# Patient Record
Sex: FEMALE | Race: WHITE | NOT HISPANIC OR LATINO | Employment: OTHER | URBAN - METROPOLITAN AREA
[De-identification: names, ages, dates, MRNs, and addresses within clinical notes are randomized per-mention and may not be internally consistent; named-entity substitution may affect disease eponyms.]

---

## 2017-01-24 ENCOUNTER — OFFICE VISIT - HEALTHEAST (OUTPATIENT)
Dept: INTERNAL MEDICINE | Facility: CLINIC | Age: 67
End: 2017-01-24

## 2017-01-24 DIAGNOSIS — M94.9 DISORDER OF BONE AND CARTILAGE: ICD-10-CM

## 2017-01-24 DIAGNOSIS — E78.1 PURE HYPERGLYCERIDEMIA: ICD-10-CM

## 2017-01-24 DIAGNOSIS — M89.9 DISORDER OF BONE AND CARTILAGE: ICD-10-CM

## 2017-01-24 DIAGNOSIS — D12.6 BENIGN NEOPLASM OF COLON: ICD-10-CM

## 2017-01-24 DIAGNOSIS — Z00.00 PREVENTATIVE HEALTH CARE: ICD-10-CM

## 2017-01-24 LAB
CHOLEST SERPL-MCNC: 237 MG/DL
FASTING STATUS PATIENT QL REPORTED: YES
HDLC SERPL-MCNC: 50 MG/DL
LDLC SERPL CALC-MCNC: 153 MG/DL
TRIGL SERPL-MCNC: 172 MG/DL

## 2017-01-24 ASSESSMENT — MIFFLIN-ST. JEOR: SCORE: 1395.74

## 2017-02-11 ENCOUNTER — RECORDS - HEALTHEAST (OUTPATIENT)
Dept: GENERAL RADIOLOGY | Age: 67
End: 2017-02-11

## 2017-02-11 ENCOUNTER — OFFICE VISIT - HEALTHEAST (OUTPATIENT)
Dept: FAMILY MEDICINE | Facility: CLINIC | Age: 67
End: 2017-02-11

## 2017-02-11 DIAGNOSIS — J01.00 ACUTE MAXILLARY SINUSITIS, UNSPECIFIED: ICD-10-CM

## 2017-02-11 DIAGNOSIS — J01.00 ACUTE NON-RECURRENT MAXILLARY SINUSITIS: ICD-10-CM

## 2017-02-11 DIAGNOSIS — R05.9 COUGH: ICD-10-CM

## 2017-02-11 DIAGNOSIS — B30.9 VIRAL CONJUNCTIVITIS: ICD-10-CM

## 2017-02-15 ENCOUNTER — HOSPITAL ENCOUNTER (OUTPATIENT)
Dept: MAMMOGRAPHY | Facility: HOSPITAL | Age: 67
Discharge: HOME | End: 2017-02-15
Attending: INTERNAL MEDICINE

## 2017-02-15 DIAGNOSIS — Z12.31 VISIT FOR SCREENING MAMMOGRAM: ICD-10-CM

## 2017-03-01 ENCOUNTER — RECORDS - HEALTHEAST (OUTPATIENT)
Dept: BONE DENSITY | Facility: CLINIC | Age: 67
End: 2017-03-01

## 2017-03-01 ENCOUNTER — RECORDS - HEALTHEAST (OUTPATIENT)
Dept: ADMINISTRATIVE | Facility: OTHER | Age: 67
End: 2017-03-01

## 2017-03-01 DIAGNOSIS — M94.9 DISORDER OF CARTILAGE, UNSPECIFIED: ICD-10-CM

## 2017-03-01 DIAGNOSIS — M89.9 DISORDER OF BONE, UNSPECIFIED: ICD-10-CM

## 2017-03-04 ENCOUNTER — COMMUNICATION - HEALTHEAST (OUTPATIENT)
Dept: INTERNAL MEDICINE | Facility: CLINIC | Age: 67
End: 2017-03-04

## 2017-10-23 ENCOUNTER — OFFICE VISIT - HEALTHEAST (OUTPATIENT)
Dept: INTERNAL MEDICINE | Facility: CLINIC | Age: 67
End: 2017-10-23

## 2017-10-23 DIAGNOSIS — J06.9 URI (UPPER RESPIRATORY INFECTION): ICD-10-CM

## 2017-10-23 DIAGNOSIS — R05.9 COUGH: ICD-10-CM

## 2017-10-23 DIAGNOSIS — R50.9 FEVER: ICD-10-CM

## 2018-02-06 ENCOUNTER — TRANSFERRED RECORDS (OUTPATIENT)
Dept: HEALTH INFORMATION MANAGEMENT | Facility: CLINIC | Age: 68
End: 2018-02-06

## 2018-02-06 ENCOUNTER — RECORDS - HEALTHEAST (OUTPATIENT)
Dept: LAB | Facility: CLINIC | Age: 68
End: 2018-02-06

## 2018-02-08 LAB — BACTERIA SPEC CULT: NO GROWTH

## 2018-02-10 LAB — BACTERIA SPEC CULT: ABNORMAL

## 2018-02-13 LAB
BACTERIA SPEC CULT: NORMAL
BACTERIA SPEC CULT: NORMAL

## 2018-02-20 ENCOUNTER — MEDICAL CORRESPONDENCE (OUTPATIENT)
Dept: HEALTH INFORMATION MANAGEMENT | Facility: CLINIC | Age: 68
End: 2018-02-20

## 2018-07-12 ENCOUNTER — RECORDS - HEALTHEAST (OUTPATIENT)
Dept: ADMINISTRATIVE | Facility: OTHER | Age: 68
End: 2018-07-12

## 2018-08-14 ENCOUNTER — OFFICE VISIT - HEALTHEAST (OUTPATIENT)
Dept: INTERNAL MEDICINE | Facility: CLINIC | Age: 68
End: 2018-08-14

## 2018-08-14 DIAGNOSIS — M89.9 DISORDER OF BONE AND CARTILAGE: ICD-10-CM

## 2018-08-14 DIAGNOSIS — M94.9 DISORDER OF BONE AND CARTILAGE: ICD-10-CM

## 2018-08-14 DIAGNOSIS — Z00.00 ROUTINE HEALTH MAINTENANCE: ICD-10-CM

## 2018-08-14 DIAGNOSIS — E78.1 PURE HYPERGLYCERIDEMIA: ICD-10-CM

## 2018-08-14 LAB
ALBUMIN SERPL-MCNC: 3.9 G/DL (ref 3.5–5)
ALP SERPL-CCNC: 92 U/L (ref 45–120)
ALT SERPL W P-5'-P-CCNC: 16 U/L (ref 0–45)
ANION GAP SERPL CALCULATED.3IONS-SCNC: 11 MMOL/L (ref 5–18)
AST SERPL W P-5'-P-CCNC: 15 U/L (ref 0–40)
BILIRUB SERPL-MCNC: 0.5 MG/DL (ref 0–1)
BUN SERPL-MCNC: 23 MG/DL (ref 8–22)
CALCIUM SERPL-MCNC: 9.9 MG/DL (ref 8.5–10.5)
CHLORIDE BLD-SCNC: 106 MMOL/L (ref 98–107)
CHOLEST SERPL-MCNC: 216 MG/DL
CO2 SERPL-SCNC: 24 MMOL/L (ref 22–31)
CREAT SERPL-MCNC: 0.8 MG/DL (ref 0.6–1.1)
ERYTHROCYTE [DISTWIDTH] IN BLOOD BY AUTOMATED COUNT: 11.6 % (ref 11–14.5)
FASTING STATUS PATIENT QL REPORTED: YES
GFR SERPL CREATININE-BSD FRML MDRD: >60 ML/MIN/1.73M2
GLUCOSE BLD-MCNC: 91 MG/DL (ref 70–125)
HCT VFR BLD AUTO: 41.2 % (ref 35–47)
HDLC SERPL-MCNC: 48 MG/DL
HGB BLD-MCNC: 14.1 G/DL (ref 12–16)
LDLC SERPL CALC-MCNC: 135 MG/DL
MCH RBC QN AUTO: 29.8 PG (ref 27–34)
MCHC RBC AUTO-ENTMCNC: 34.3 G/DL (ref 32–36)
MCV RBC AUTO: 87 FL (ref 80–100)
PLATELET # BLD AUTO: 249 THOU/UL (ref 140–440)
PMV BLD AUTO: 7 FL (ref 7–10)
POTASSIUM BLD-SCNC: 4.9 MMOL/L (ref 3.5–5)
PROT SERPL-MCNC: 7.2 G/DL (ref 6–8)
RBC # BLD AUTO: 4.74 MILL/UL (ref 3.8–5.4)
SODIUM SERPL-SCNC: 141 MMOL/L (ref 136–145)
TRIGL SERPL-MCNC: 163 MG/DL
WBC: 3.8 THOU/UL (ref 4–11)

## 2018-08-14 ASSESSMENT — MIFFLIN-ST. JEOR: SCORE: 1373.64

## 2018-08-15 LAB
25(OH)D3 SERPL-MCNC: 33 NG/ML (ref 30–80)
25(OH)D3 SERPL-MCNC: 33 NG/ML (ref 30–80)

## 2018-09-13 ENCOUNTER — RECORDS - HEALTHEAST (OUTPATIENT)
Dept: ADMINISTRATIVE | Facility: OTHER | Age: 68
End: 2018-09-13

## 2018-12-03 ENCOUNTER — COMMUNICATION - HEALTHEAST (OUTPATIENT)
Dept: INTERNAL MEDICINE | Facility: CLINIC | Age: 68
End: 2018-12-03

## 2018-12-06 ENCOUNTER — AMBULATORY - HEALTHEAST (OUTPATIENT)
Dept: NURSING | Facility: CLINIC | Age: 68
End: 2018-12-06

## 2018-12-06 DIAGNOSIS — Z23 NEED FOR SHINGLES VACCINE: ICD-10-CM

## 2018-12-15 ENCOUNTER — HOSPITAL ENCOUNTER (OUTPATIENT)
Dept: MAMMOGRAPHY | Facility: CLINIC | Age: 68
Discharge: HOME | End: 2018-12-15
Attending: INTERNAL MEDICINE

## 2018-12-15 DIAGNOSIS — Z12.31 VISIT FOR SCREENING MAMMOGRAM: ICD-10-CM

## 2019-03-26 ENCOUNTER — TRANSFERRED RECORDS (OUTPATIENT)
Dept: HEALTH INFORMATION MANAGEMENT | Facility: CLINIC | Age: 69
End: 2019-03-26

## 2019-06-25 ENCOUNTER — RECORDS - HEALTHEAST (OUTPATIENT)
Dept: ADMINISTRATIVE | Facility: OTHER | Age: 69
End: 2019-06-25

## 2019-09-10 ENCOUNTER — TRANSFERRED RECORDS (OUTPATIENT)
Dept: HEALTH INFORMATION MANAGEMENT | Facility: CLINIC | Age: 69
End: 2019-09-10

## 2019-10-14 ENCOUNTER — COMMUNICATION - HEALTHEAST (OUTPATIENT)
Dept: INTERNAL MEDICINE | Facility: CLINIC | Age: 69
End: 2019-10-14

## 2019-12-30 ENCOUNTER — TRANSFERRED RECORDS (OUTPATIENT)
Dept: HEALTH INFORMATION MANAGEMENT | Facility: CLINIC | Age: 69
End: 2019-12-30

## 2020-01-17 ENCOUNTER — OFFICE VISIT - HEALTHEAST (OUTPATIENT)
Dept: INTERNAL MEDICINE | Facility: CLINIC | Age: 70
End: 2020-01-17

## 2020-01-17 DIAGNOSIS — M89.9 DISORDER OF BONE AND CARTILAGE: ICD-10-CM

## 2020-01-17 DIAGNOSIS — Z00.00 ENCOUNTER FOR PREVENTIVE CARE: ICD-10-CM

## 2020-01-17 DIAGNOSIS — E78.1 PURE HYPERGLYCERIDEMIA: ICD-10-CM

## 2020-01-17 DIAGNOSIS — M94.9 DISORDER OF BONE AND CARTILAGE: ICD-10-CM

## 2020-01-17 LAB
ALBUMIN SERPL-MCNC: 4.1 G/DL (ref 3.5–5)
ALP SERPL-CCNC: 102 U/L (ref 45–120)
ALT SERPL W P-5'-P-CCNC: 17 U/L (ref 0–45)
ANION GAP SERPL CALCULATED.3IONS-SCNC: 8 MMOL/L (ref 5–18)
AST SERPL W P-5'-P-CCNC: 17 U/L (ref 0–40)
BILIRUB SERPL-MCNC: 0.4 MG/DL (ref 0–1)
BUN SERPL-MCNC: 19 MG/DL (ref 8–22)
CALCIUM SERPL-MCNC: 9.5 MG/DL (ref 8.5–10.5)
CHLORIDE BLD-SCNC: 108 MMOL/L (ref 98–107)
CHOLEST SERPL-MCNC: 224 MG/DL
CO2 SERPL-SCNC: 24 MMOL/L (ref 22–31)
CREAT SERPL-MCNC: 0.78 MG/DL (ref 0.6–1.1)
ERYTHROCYTE [DISTWIDTH] IN BLOOD BY AUTOMATED COUNT: 11.1 % (ref 11–14.5)
FASTING STATUS PATIENT QL REPORTED: YES
GFR SERPL CREATININE-BSD FRML MDRD: >60 ML/MIN/1.73M2
GLUCOSE BLD-MCNC: 98 MG/DL (ref 70–125)
HBA1C MFR BLD: 6 % (ref 3.5–6)
HCT VFR BLD AUTO: 42.6 % (ref 35–47)
HDLC SERPL-MCNC: 59 MG/DL
HGB BLD-MCNC: 14.7 G/DL (ref 12–16)
LDLC SERPL CALC-MCNC: 142 MG/DL
MCH RBC QN AUTO: 31 PG (ref 27–34)
MCHC RBC AUTO-ENTMCNC: 34.4 G/DL (ref 32–36)
MCV RBC AUTO: 90 FL (ref 80–100)
PLATELET # BLD AUTO: 238 THOU/UL (ref 140–440)
PMV BLD AUTO: 7.3 FL (ref 7–10)
POTASSIUM BLD-SCNC: 4.5 MMOL/L (ref 3.5–5)
PROT SERPL-MCNC: 7.1 G/DL (ref 6–8)
RBC # BLD AUTO: 4.73 MILL/UL (ref 3.8–5.4)
SODIUM SERPL-SCNC: 140 MMOL/L (ref 136–145)
TRIGL SERPL-MCNC: 116 MG/DL
WBC: 4.1 THOU/UL (ref 4–11)

## 2020-01-17 ASSESSMENT — MIFFLIN-ST. JEOR: SCORE: 1370.47

## 2020-01-17 ASSESSMENT — ANXIETY QUESTIONNAIRES
1. FEELING NERVOUS, ANXIOUS, OR ON EDGE: NOT AT ALL
2. NOT BEING ABLE TO STOP OR CONTROL WORRYING: NOT AT ALL

## 2020-01-20 ENCOUNTER — AMBULATORY - HEALTHEAST (OUTPATIENT)
Dept: SCHEDULING | Facility: CLINIC | Age: 70
End: 2020-01-20

## 2020-01-20 DIAGNOSIS — M89.9 DISORDER OF BONE AND CARTILAGE: ICD-10-CM

## 2020-01-20 DIAGNOSIS — M94.9 DISORDER OF BONE AND CARTILAGE: ICD-10-CM

## 2020-01-20 LAB
25(OH)D3 SERPL-MCNC: 30 NG/ML (ref 30–80)
25(OH)D3 SERPL-MCNC: 30 NG/ML (ref 30–80)

## 2020-02-18 ENCOUNTER — HOSPITAL ENCOUNTER (OUTPATIENT)
Dept: MAMMOGRAPHY | Facility: CLINIC | Age: 70
Discharge: HOME | End: 2020-02-18
Attending: INTERNAL MEDICINE

## 2020-02-18 DIAGNOSIS — Z12.31 VISIT FOR SCREENING MAMMOGRAM: ICD-10-CM

## 2020-02-18 DIAGNOSIS — Z00.00 ENCOUNTER FOR PREVENTIVE CARE: ICD-10-CM

## 2020-02-24 ENCOUNTER — HOSPITAL ENCOUNTER (OUTPATIENT)
Dept: MAMMOGRAPHY | Facility: CLINIC | Age: 70
Discharge: HOME | End: 2020-02-24
Attending: INTERNAL MEDICINE

## 2020-02-24 DIAGNOSIS — N64.89 BREAST ASYMMETRY: ICD-10-CM

## 2020-03-19 ENCOUNTER — TELEPHONE (OUTPATIENT)
Dept: OPHTHALMOLOGY | Facility: CLINIC | Age: 70
End: 2020-03-19

## 2020-03-19 NOTE — TELEPHONE ENCOUNTER
COVID-19 RESCHEDULES    Date contacted: March 19, 2020 9:09 AM    Type of contact: Spoke with patient or left message    Current appointment date: 3/26/2020      Attending provider: rosario    Current Eye Symptoms: NA    Refills needed: NA    Best contact for rescheduling: NA    Best date/time for rescheduling: NA    AMELIA Cook 9:09 AM March 19, 2020

## 2020-05-06 ENCOUNTER — TELEPHONE (OUTPATIENT)
Dept: OPHTHALMOLOGY | Facility: CLINIC | Age: 70
End: 2020-05-06

## 2020-05-20 NOTE — TELEPHONE ENCOUNTER
Scheduled first available with Dr. Yi July 24th  Left message with information and reviewed Dr. Yi/facitiator reviewing referrals individually and will send message to facilitator to review confirm first available vs sooner appt    Brian Quiles RN 4:58 PM 05/20/20        [facility]    Phone Message    May a detailed message be left on voicemail: yes     Reason for Call: Other: Patient is calling she has been waiting for a call back since 05/06 for a second opinion. Please follow up asap to discuss. Thank you.     Action Taken: Message routed to:  [facility] eye    Travel Screening: Not Applicable

## 2020-07-08 ENCOUNTER — COMMUNICATION - HEALTHEAST (OUTPATIENT)
Dept: INTERNAL MEDICINE | Facility: CLINIC | Age: 70
End: 2020-07-08

## 2020-07-08 DIAGNOSIS — R92.8 ABNORMAL MAMMOGRAM: ICD-10-CM

## 2020-08-05 ENCOUNTER — HOSPITAL ENCOUNTER (OUTPATIENT)
Dept: MAMMOGRAPHY | Facility: CLINIC | Age: 70
Discharge: HOME | End: 2020-08-05
Attending: INTERNAL MEDICINE

## 2020-08-05 DIAGNOSIS — R92.8 ABNORMAL MAMMOGRAM: ICD-10-CM

## 2020-10-23 ENCOUNTER — VIRTUAL VISIT (OUTPATIENT)
Dept: FAMILY MEDICINE | Facility: OTHER | Age: 70
End: 2020-10-23

## 2020-10-23 DIAGNOSIS — Z20.822 SUSPECTED COVID-19 VIRUS INFECTION: ICD-10-CM

## 2020-10-23 DIAGNOSIS — Z20.822 SUSPECTED COVID-19 VIRUS INFECTION: Primary | ICD-10-CM

## 2020-10-23 PROCEDURE — U0003 INFECTIOUS AGENT DETECTION BY NUCLEIC ACID (DNA OR RNA); SEVERE ACUTE RESPIRATORY SYNDROME CORONAVIRUS 2 (SARS-COV-2) (CORONAVIRUS DISEASE [COVID-19]), AMPLIFIED PROBE TECHNIQUE, MAKING USE OF HIGH THROUGHPUT TECHNOLOGIES AS DESCRIBED BY CMS-2020-01-R: HCPCS | Performed by: FAMILY MEDICINE

## 2020-10-23 NOTE — PROGRESS NOTES
"Date: 10/23/2020 09:53:51  Clinician: Yelitza Ross  Clinician NPI: [id number]  Patient: Vida Najera  Patient : 1950  [place] [address]  Patient Phone: [phone]  Visit Protocol: URI  Patient Summary:  Vida is a 70 year old ( : 1950 ) female who initiated a [facility] Visit for COVID-19 (Coronavirus) evaluation and screening. When asked the question \"Please sign me up to receive news, health information and promotions. \", Vida responded \"Yes\".    Vida states her symptoms started 1-2 days ago.   Her symptoms consist of a headache, nasal congestion, and myalgia.   Symptom details     Nasal secretions: The color of her mucus is clear.    Headache: She states the headache is mild (1-3 on a 10 point pain scale).      Vida denies having ear pain, wheezing, fever, enlarged lymph nodes, cough, anosmia, vomiting, rhinitis, nausea, facial pain or pressure, chills, malaise, sore throat, teeth pain, ageusia, and diarrhea. She also denies taking antibiotic medication in the past month and having recent facial or sinus surgery in the past 60 days. She is not experiencing dyspnea.   Precipitating events  She has not recently been exposed to someone with influenza. Vida has been in close contact with the following high risk individuals: adults 65 or older and children under the age of 5.   Pertinent COVID-19 (Coronavirus) information  In the past 14 days, Vida has not worked in a congregate living setting.   She does not work or volunteer as healthcare worker or a  and does not work or volunteer in a healthcare facility.   Vida also has not lived in a congregate living setting in the past 14 days. She does not live with a healthcare worker.   Vida has not had a close contact with a laboratory-confirmed COVID-19 patient within 14 days of symptom onset.   Since 2019, Vida and has not had upper respiratory infection or influenza-like illness. " Has not been diagnosed with lab-confirmed COVID-19 test   Pertinent medical history  Vida does not get yeast infections when she takes antibiotics.   Vida does not need a return to work/school note.   Weight: 175 lbs   Vida does not smoke or use smokeless tobacco.   Additional information as reported by the patient (free text):  had contact with someone who tested positive for Covid virus.  He had fever, headache.  I have had contact with him, although tried to isolate from each other once he had symptoms.  I had headache, fatigue and low-grade fever Wed-Thurs.  My son-in-law (who we saw Sunday katie frequently) is health care worker.  There are grandchildren we also saw at the same time.   Weight: 175 lbs    MEDICATIONS: PreserVision AREDS oral, Vitamin D3 oral, acyclovir oral, ALLERGIES: NKDA  Clinician Response:  Dear Vida,         Your symptoms show that you may have coronavirus (COVID-19). This&nbsp;illness can cause fever, cough and trouble breathing. Many people get a mild case and get better on their own. Some people can get very sick.  What should I do?  We would like to test you for this virus.  1. Please call [phone] to schedule your visit. Explain that you were referred by [facility] to have a COVID-19 test. Be ready to share your [facility] visit ID number. Do not schedule your appointment until you have had at least 2 days of symptoms or you may receive a false negative result.  The following will serve as your written order for this COVID Test, ordered by me, for the indication of suspected COVID [Z20.828]: The test will be ordered in [facility], our electronic health record, after you are scheduled. It will show as ordered and authorized by Seth Hayden MD.  Order: COVID-19 (Coronavirus) PCR for SYMPTOMATIC testing from [facility].    2. When it's time for your COVID test:  Stay at least 6 feet away from others. (If someone will drive you to your test, stay in the backseat, as far away from the  as  "you can.)  Cover your mouth and nose with a mask, tissue or washcloth.  Go straight to the testing site. Don't make any stops on the way there or back.    3.Starting now:&nbsp;Stay home and away from others (self-isolate) until:   You've had&nbsp;no&nbsp;fever---and no medicine that reduces fever---for one full day (24 hours).&nbsp;And...  Your other symptoms have gotten better. For example, your cough or breathing has improved.&nbsp;And...  At least&nbsp;10 days&nbsp;have passed since your symptoms started.    During this time, don't leave the house except for testing or medical care.   Stay in your own room, even for meals. Use your own bathroom if you can.  Stay away from others in your home. No hugging, kissing or shaking hands. No visitors.  Don't go to work, school or anywhere else.   Clean \"high touch\" surfaces often (doorknobs, counters, handles, etc.). Use a household cleaning spray or wipes. You'll find a full list of  on the EPA website:&nbsp;www.epa.gov/pesticide-registration/list-n-disinfectants-use-against-sars-cov-2.   Cover your mouth and nose with a mask, tissue or washcloth to avoid spreading germs.  Wash your hands and face often. Use soap and water.  Caregivers in these groups are at risk for severe illness due to COVID-19:  o People 65 years and older  o People who live in a nursing home or long-term care facility  o People with chronic disease (lung, heart, cancer, diabetes, kidney, liver, immunologic)  o People who have a weakened immune system, including those who:   Are in cancer treatment  Take medicine that weakens the immune system, such as corticosteroids  Had a bone marrow or organ transplant  Have an immune deficiency  Have poorly controlled HIV or AIDS  Are obese (body mass index of 40 or higher)  Smoke regularly   o Caregivers should wear gloves while washing dishes, handling laundry and cleaning bedrooms and bathrooms.  o Use caution when washing and drying laundry: Don't " shake dirty laundry, and use the warmest water setting that you can.  o For more tips, go to&nbsp;www.cdc.gov/coronavirus/2019-ncov/downloads/10Things.pdf.   How can I take care of myself?    Get lots of rest. Drink extra fluids&nbsp;(unless a doctor has told you not to).  Take Tylenol (acetaminophen) for fever or pain.&nbsp;If you have liver or kidney problems, ask your family doctor if it's okay to take Tylenol.   Adults can take either:   650 mg (two 325 mg pills) every 4 to 6 hours,&nbsp;or...  1,000 mg (two 500 mg pills) every 8 hours as needed.  Note:&nbsp;Don't take more than 3,000 mg in one day. Acetaminophen is found in many medicines (both prescribed and over-the-counter medicines). Read all labels to be sure you don't take too much.   For children, check the Tylenol bottle for the right dose. The dose is based on the child's age or weight.   If you have other health problems (like cancer, heart failure, an organ transplant or severe kidney disease):&nbsp;Call your specialty clinic if you don't feel better in the next 2 days.    Know when to call 911.&nbsp;Emergency warning signs include:   Trouble breathing or shortness of breath Pain or pressure in the chest that doesn't go away Feeling confused like you haven't felt before, or not being able to wake up Bluish-colored lips or face.  Where can I get more information?   [facility] -- About COVID-19:&nbsp;[email or web address]  CDC -- What to Do If You're Sick:&nbsp;www.cdc.gov/coronavirus/2019-ncov/about/steps-when-sick.html  CDC -- Ending Home Isolation:&nbsp;www.cdc.gov/coronavirus/2019-ncov/hcp/disposition-in-home-patients.html  CDC -- Caring for Someone:&nbsp;www.cdc.gov/coronavirus/2019-ncov/if-you-are-sick/care-for-someone.html  [facility] -- Interim Guidance for Hospital Discharge to Home:&nbsp;www.[facility].mn.us/diseases/coronavirus/hcp/hospdischarge.pdf  [facility] clinical trials (COVID-19 research  studies):&nbsp;clinicalaffairs.[facility]-clinical-trials  Below are the COVID-19 hotlines at the [place] [facility] ([facility]). Interpreters are available.   For health questions: Call 310-810-8420 or 1-382.688.2647 (7 a.m. to 7 p.m.) For questions about schools and childcare: Call 055-115-5046 or 1-973.643.2416 (7 a.m. to 7 p.m.)           Diagnosis: Contact with and (suspected) exposure to other viral communicable diseases  Diagnosis ICD: Z20.828

## 2020-10-24 LAB
SARS-COV-2 RNA SPEC QL NAA+PROBE: NOT DETECTED
SPECIMEN SOURCE: NORMAL

## 2020-11-14 ENCOUNTER — HEALTH MAINTENANCE LETTER (OUTPATIENT)
Age: 70
End: 2020-11-14

## 2021-02-01 ENCOUNTER — COMMUNICATION - HEALTHEAST (OUTPATIENT)
Dept: INTERNAL MEDICINE | Facility: CLINIC | Age: 71
End: 2021-02-01

## 2021-02-01 ENCOUNTER — AMBULATORY - HEALTHEAST (OUTPATIENT)
Dept: INTERNAL MEDICINE | Facility: CLINIC | Age: 71
End: 2021-02-01

## 2021-02-01 DIAGNOSIS — R92.8 ABNORMAL MAMMOGRAM: ICD-10-CM

## 2021-03-02 ENCOUNTER — OFFICE VISIT - HEALTHEAST (OUTPATIENT)
Dept: INTERNAL MEDICINE | Facility: CLINIC | Age: 71
End: 2021-03-02

## 2021-03-02 DIAGNOSIS — Z00.00 MEDICARE ANNUAL WELLNESS VISIT, SUBSEQUENT: ICD-10-CM

## 2021-03-02 DIAGNOSIS — Z00.00 ENCOUNTER FOR PREVENTIVE CARE: ICD-10-CM

## 2021-03-02 DIAGNOSIS — M94.9 DISORDER OF BONE AND CARTILAGE: ICD-10-CM

## 2021-03-02 DIAGNOSIS — M89.9 DISORDER OF BONE AND CARTILAGE: ICD-10-CM

## 2021-03-02 DIAGNOSIS — D12.6 BENIGN NEOPLASM OF COLON, UNSPECIFIED PART OF COLON: ICD-10-CM

## 2021-03-02 DIAGNOSIS — Z20.822 ENCOUNTER FOR LABORATORY TESTING FOR COVID-19 VIRUS: ICD-10-CM

## 2021-03-02 DIAGNOSIS — E74.818 OTHER DISORDERS OF GLUCOSE TRANSPORT (H): ICD-10-CM

## 2021-03-02 DIAGNOSIS — E78.1 PURE HYPERGLYCERIDEMIA: ICD-10-CM

## 2021-03-02 LAB
ALBUMIN SERPL-MCNC: 4 G/DL (ref 3.5–5)
ALP SERPL-CCNC: 104 U/L (ref 45–120)
ALT SERPL W P-5'-P-CCNC: 14 U/L (ref 0–45)
ANION GAP SERPL CALCULATED.3IONS-SCNC: 6 MMOL/L (ref 5–18)
AST SERPL W P-5'-P-CCNC: 15 U/L (ref 0–40)
BILIRUB SERPL-MCNC: 0.4 MG/DL (ref 0–1)
BUN SERPL-MCNC: 22 MG/DL (ref 8–28)
CALCIUM SERPL-MCNC: 9 MG/DL (ref 8.5–10.5)
CHLORIDE BLD-SCNC: 106 MMOL/L (ref 98–107)
CHOLEST SERPL-MCNC: 233 MG/DL
CO2 SERPL-SCNC: 28 MMOL/L (ref 22–31)
CREAT SERPL-MCNC: 0.77 MG/DL (ref 0.6–1.1)
ERYTHROCYTE [DISTWIDTH] IN BLOOD BY AUTOMATED COUNT: 12.8 % (ref 11–14.5)
FASTING STATUS PATIENT QL REPORTED: YES
GFR SERPL CREATININE-BSD FRML MDRD: >60 ML/MIN/1.73M2
GLUCOSE BLD-MCNC: 92 MG/DL (ref 70–125)
HBA1C MFR BLD: 5.6 %
HCT VFR BLD AUTO: 42.6 % (ref 35–47)
HDLC SERPL-MCNC: 53 MG/DL
HGB BLD-MCNC: 13.8 G/DL (ref 12–16)
LDLC SERPL CALC-MCNC: 138 MG/DL
MCH RBC QN AUTO: 29.8 PG (ref 27–34)
MCHC RBC AUTO-ENTMCNC: 32.4 G/DL (ref 32–36)
MCV RBC AUTO: 92 FL (ref 80–100)
PLATELET # BLD AUTO: 254 THOU/UL (ref 140–440)
PMV BLD AUTO: 9.8 FL (ref 7–10)
POTASSIUM BLD-SCNC: 4.7 MMOL/L (ref 3.5–5)
PROT SERPL-MCNC: 7.1 G/DL (ref 6–8)
RBC # BLD AUTO: 4.63 MILL/UL (ref 3.8–5.4)
SODIUM SERPL-SCNC: 140 MMOL/L (ref 136–145)
TRIGL SERPL-MCNC: 211 MG/DL
TSH SERPL DL<=0.005 MIU/L-ACNC: 2.51 UIU/ML (ref 0.3–5)
WBC: 3.9 THOU/UL (ref 4–11)

## 2021-03-02 RX ORDER — ANTIOX #8/OM3/DHA/EPA/LUT/ZEAX 250-2.5 MG
CAPSULE ORAL
Status: SHIPPED | COMMUNITY
Start: 2020-08-26

## 2021-03-02 RX ORDER — TIMOLOL MALEATE 5 MG/ML
1 SOLUTION/ DROPS OPHTHALMIC DAILY
Status: SHIPPED | COMMUNITY
Start: 2020-08-26 | End: 2023-03-15

## 2021-03-02 ASSESSMENT — MIFFLIN-ST. JEOR: SCORE: 1389.52

## 2021-03-03 LAB
25(OH)D3 SERPL-MCNC: 32 NG/ML (ref 30–80)
25(OH)D3 SERPL-MCNC: 32 NG/ML (ref 30–80)

## 2021-03-04 ENCOUNTER — COMMUNICATION - HEALTHEAST (OUTPATIENT)
Dept: INTERNAL MEDICINE | Facility: CLINIC | Age: 71
End: 2021-03-04

## 2021-03-08 ENCOUNTER — AMBULATORY - HEALTHEAST (OUTPATIENT)
Dept: FAMILY MEDICINE | Facility: CLINIC | Age: 71
End: 2021-03-08

## 2021-03-08 DIAGNOSIS — Z20.822 ENCOUNTER FOR LABORATORY TESTING FOR COVID-19 VIRUS: ICD-10-CM

## 2021-03-09 LAB
SARS-COV-2 PCR COMMENT: NORMAL
SARS-COV-2 RNA SPEC QL NAA+PROBE: NEGATIVE
SARS-COV-2 VIRUS SPECIMEN SOURCE: NORMAL

## 2021-03-10 ENCOUNTER — COMMUNICATION - HEALTHEAST (OUTPATIENT)
Dept: SCHEDULING | Facility: CLINIC | Age: 71
End: 2021-03-10

## 2021-03-22 ENCOUNTER — HOSPITAL ENCOUNTER (OUTPATIENT)
Dept: MAMMOGRAPHY | Facility: CLINIC | Age: 71
Discharge: HOME | End: 2021-03-22
Attending: INTERNAL MEDICINE

## 2021-03-22 ENCOUNTER — OFFICE VISIT - HEALTHEAST (OUTPATIENT)
Dept: INTERNAL MEDICINE | Facility: CLINIC | Age: 71
End: 2021-03-22

## 2021-03-22 DIAGNOSIS — R92.8 ABNORMAL MAMMOGRAM: ICD-10-CM

## 2021-03-22 DIAGNOSIS — L50.9 URTICARIA: ICD-10-CM

## 2021-03-22 RX ORDER — ACYCLOVIR 400 MG/1
400 TABLET ORAL 2 TIMES DAILY
Status: SHIPPED | COMMUNITY
Start: 2021-03-03 | End: 2023-11-29

## 2021-05-29 ENCOUNTER — RECORDS - HEALTHEAST (OUTPATIENT)
Dept: ADMINISTRATIVE | Facility: CLINIC | Age: 71
End: 2021-05-29

## 2021-05-30 VITALS — BODY MASS INDEX: 29.03 KG/M2 | WEIGHT: 185 LBS | HEIGHT: 67 IN

## 2021-05-30 VITALS — WEIGHT: 184.1 LBS | BODY MASS INDEX: 28.62 KG/M2

## 2021-05-31 VITALS — BODY MASS INDEX: 27.94 KG/M2 | WEIGHT: 179.7 LBS

## 2021-06-01 VITALS — HEIGHT: 67 IN | WEIGHT: 181 LBS | BODY MASS INDEX: 28.41 KG/M2

## 2021-06-02 ENCOUNTER — RECORDS - HEALTHEAST (OUTPATIENT)
Dept: ADMINISTRATIVE | Facility: CLINIC | Age: 71
End: 2021-06-02

## 2021-06-02 NOTE — TELEPHONE ENCOUNTER
Who is calling:  Vida  Reason for Call:  Patient wants to schedule an Annual Wellness Visit.  The first opening this writer could find was January 17, 2020.  Patient would like to be put on a wait list for a sooner appointment.    Date of last appointment with primary care: 8/14/2018  Okay to leave a detailed message: Yes

## 2021-06-04 VITALS
BODY MASS INDEX: 28.3 KG/M2 | HEART RATE: 78 BPM | WEIGHT: 180.3 LBS | SYSTOLIC BLOOD PRESSURE: 118 MMHG | HEIGHT: 67 IN | OXYGEN SATURATION: 98 % | DIASTOLIC BLOOD PRESSURE: 72 MMHG

## 2021-06-05 ENCOUNTER — RECORDS - HEALTHEAST (OUTPATIENT)
Dept: INTERNAL MEDICINE | Facility: CLINIC | Age: 71
End: 2021-06-05

## 2021-06-05 VITALS
DIASTOLIC BLOOD PRESSURE: 76 MMHG | OXYGEN SATURATION: 97 % | WEIGHT: 180 LBS | HEART RATE: 83 BPM | BODY MASS INDEX: 28.19 KG/M2 | SYSTOLIC BLOOD PRESSURE: 126 MMHG

## 2021-06-05 VITALS
OXYGEN SATURATION: 98 % | BODY MASS INDEX: 28.96 KG/M2 | RESPIRATION RATE: 18 BRPM | DIASTOLIC BLOOD PRESSURE: 70 MMHG | WEIGHT: 184.5 LBS | HEART RATE: 82 BPM | HEIGHT: 67 IN | SYSTOLIC BLOOD PRESSURE: 122 MMHG

## 2021-06-05 DIAGNOSIS — M94.9 DISORDER OF BONE AND CARTILAGE: ICD-10-CM

## 2021-06-05 DIAGNOSIS — M89.9 DISORDER OF BONE AND CARTILAGE: ICD-10-CM

## 2021-06-05 NOTE — PROGRESS NOTES
Assessment and Plan:   Annual wellness reviewed.  No new needs identified.  Patient is living independently and doing quite well.    1. Encounter for preventive care  She is up-to-date on immunizations.  Colon cancer screening is up-to-date.  She is due for an update on both mammogram and bone densitometry.  Additionally, have discussed the importance of a healthy diet, maintenance of healthy weight and regular exercise.  - Mammo Screening Bilateral; Future  - HM2(CBC w/o Differential)  - Comprehensive Metabolic Panel    2. Essential Hypertriglyceridemia  We will update labs.  If cholesterol is still altered, patient believes that she can do some improvement with exercise.  - Lipid Cascade FASTING  - Glycosylated Hemoglobin A1c    3. Osteopenia  Due for bone densitometry.  Last bone density 3 years ago was low risk however.  Have discussed the importance of weightbearing and balance exercise  - Vitamin D, Total (25-Hydroxy)  - DXA Bone Density Scan; Future     The patient's current medical problems were reviewed.      The following health maintenance schedule was reviewed with the patient and provided in printed form in the after visit summary:   Health Maintenance   Topic Date Due     TD 18+ HE  04/15/2017     DXA SCAN  03/01/2019     MAMMOGRAM  12/15/2020     MEDICARE ANNUAL WELLNESS VISIT  01/17/2021     FALL RISK ASSESSMENT  01/17/2021     LIPID  08/14/2023     ADVANCE CARE PLANNING  08/14/2023     COLONOSCOPY  06/25/2029     HEPATITIS C SCREENING  Completed     PNEUMOCOCCAL IMMUNIZATION 65+ LOW/MEDIUM RISK  Completed     INFLUENZA VACCINE RULE BASED  Completed     ZOSTER VACCINES  Completed        Subjective:   Chief Complaint: Vida Najera is an 70 y.o. female here for an Annual Wellness visit.   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  She has absolutely no chief complaints.    Have discussed health  maintenance issues.  She has some concerns regarding long-term use of minocycline for her ocular rosacea.  She also has glaucoma and will need cataract surgery.  Of note, she denies any untoward reactions to local or general anesthetic agents.    She has some questions with regard to aspirin use and calcium supplementation.  The data with regard to these are reviewed.    Lifestyle reviewed.  She is not exercising regularly.  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  She states she is up and down the stairs with children.    Review of Systems: She denies any chest pain, shortness of breath, bowel or bladder issues please see above.  The rest of the review of systems are negative for all systems.    Patient Care Team:  Luiza Gannon MD as PCP - Mike Archer MD as Physician (Ophthalmology)  Luiza Gannon MD as Assigned PCP     Patient Active Problem List   Diagnosis     Benign Adenomatous Polyp Of The Large Intestine     Essential Hypertriglyceridemia     Osteopenia     Menopause Has Occurred     No past medical history on file.   Past Surgical History:   Procedure Laterality Date     BREAST BIOPSY Right 2002     HYSTERECTOMY  2005     OOPHORECTOMY Bilateral 2005      Family History   Problem Relation Age of Onset     No Medical Problems Mother      No Medical Problems Sister      No Medical Problems Daughter      No Medical Problems Maternal Grandmother      No Medical Problems Maternal Grandfather      No Medical Problems Paternal Grandmother      No Medical Problems Paternal Grandfather      No Medical Problems Maternal Aunt      No Medical Problems Paternal Aunt      Coronary artery disease Father         And Grandpa      Celiac disease Daughter         &Brother     BRCA 1/2 Neg Hx      Breast cancer Neg Hx      Cancer Neg Hx      Colon cancer Neg Hx      Endometrial cancer Neg Hx      Ovarian cancer Neg Hx       Social History     Socioeconomic History      "Marital status:      Spouse name: Not on file     Number of children: Not on file     Years of education: Not on file     Highest education level: Not on file   Occupational History     Not on file   Social Needs     Financial resource strain: Not on file     Food insecurity:     Worry: Not on file     Inability: Not on file     Transportation needs:     Medical: Not on file     Non-medical: Not on file   Tobacco Use     Smoking status: Never Smoker     Smokeless tobacco: Never Used   Substance and Sexual Activity     Alcohol use: Not on file     Drug use: Not on file     Sexual activity: Not on file   Lifestyle     Physical activity:     Days per week: Not on file     Minutes per session: Not on file     Stress: Not on file   Relationships     Social connections:     Talks on phone: Not on file     Gets together: Not on file     Attends Protestant service: Not on file     Active member of club or organization: Not on file     Attends meetings of clubs or organizations: Not on file     Relationship status: Not on file     Intimate partner violence:     Fear of current or ex partner: Not on file     Emotionally abused: Not on file     Physically abused: Not on file     Forced sexual activity: Not on file   Other Topics Concern     Not on file   Social History Narrative     Not on file      Current Outpatient Medications   Medication Sig Dispense Refill     cholecalciferol, vitamin D3, (VITAMIN D3) 2,000 unit cap Take 1 tablet by mouth once a week.        doxycycline (MONODOX) 50 MG capsule Take 50 mg by mouth daily.       latanoprost (XALATAN) 0.005 % ophthalmic solution 1 drop at bedtime.       PROPYLENE GLYCOL/ (SYSTANE ULTRA OPHT) Apply to eye 2 (two) times a day.       No current facility-administered medications for this visit.       Objective:   Vital Signs:   Visit Vitals  /72 (Patient Site: Left Arm, Patient Position: Sitting, Cuff Size: Adult Large)   Pulse 78   Ht 5' 7\" (1.702 m)   Wt 180 " lb 4.8 oz (81.8 kg)   LMP 05/07/2005   SpO2 98%   BMI 28.24 kg/m         VisionScreening:  No exam data present     PHYSICAL EXAM  EYES: Eyelids, conjunctiva, and sclera were normal. Pupils were normal. Cornea, iris, and lens were normal bilaterally.  HEAD, EARS, NOSE, MOUTH, AND THROAT: Head and face were normal. Hearing was normal to voice and the ears were normal to external exam. Nose appearance was normal and there was no discharge. Oropharynx was normal.  TMs were normal.  NECK: Neck appearance was normal. There were no neck masses and the thyroid was not enlarged.  RESPIRATORY: Breathing pattern was normal and the chest moved symmetrically.   Lung sounds were equal bilaterally.  CARDIOVASCULAR: Heart rate and rhythm were normal.  S1 and S2 were normal and there were no extra sounds or murmurs. Peripheral pulses in arms and legs were normal.  Jugular venous pressure was normal.  There was no peripheral edema.  GASTROINTESTINAL: The abdomen was normal in contour.  Bowel sounds were present.   Palpation detected no tenderness, mass, or enlarged organs.   MUSCULOSKELETAL: Skeletal configuration was normal and muscle mass was normal for age. Joint appearance was overall normal.  LYMPHATIC: There were no enlarged nodes.  SKIN/HAIR/NAILS: Skin color was normal.  There were no abnormal skin lesions.  Hair and nails were normal.  NEUROLOGIC: The patient was alert and oriented to person, place, time, and circumstance. Speech was normal. Cranial nerves were normal. Motor strength was normal for age. The patient was normally coordinated.  PSYCHIATRIC:  Mood and affect were normal and the patient had normal recent and remote memory. The patient's judgment and insight were normal.  BREASTS: Examined bilaterally and within normal limits        Assessment Results 1/17/2020   Activities of Daily Living No help needed   Instrumental Activities of Daily Living No help needed   Get Up and Go Score Less than 12 seconds   Mini Cog  Total Score 4   Some recent data might be hidden     A Mini-Cog score of 0-2 suggests the possibility of dementia, score of 3-5 suggests no dementia    Identified Health Risks:     She is at risk for lack of exercise and has been provided with information to increase physical activity for the benefit of her well-being.  Patient's advanced directive was discussed and I have discussed my concerns regarding the patient's wishes.

## 2021-06-08 NOTE — PROGRESS NOTES
"Assessment and Plan:     1. Preventative health care  Mammogram is scheduled.  She is due for a DEXA at her convenience.  Colonoscopy was done in 2014 and she is on a 5 year plan.  Immunizations to be updated today with a Prevnar 13.  Influenza vaccination up-to-date.  Ophthalmologic, dental and her care are up-to-date.  Dermatologic care is every 5 years.    Recommendations: Have encouraged regular daily exercise.  Also recommend portion control and modest weight loss.    - HM2(CBC w/o Differential)    2. Essential Hypertriglyceridemia  We'll update labs and give consult  - Lipid Cascade FASTING  - Basic Metabolic Panel    3. Osteopenia  Have discussed the importance of weightbearing exercise along with core strengthening and balance activities.  She is getting approximately 4-5 servings of dietary calcium per day and is supplementing with vitamin D.  We'll check levels and bone density  - Thyroid Stimulating Hormone (TSH)  - Vitamin D, Total (25-Hydroxy)  - DXA Bone Density Scan; Future    4. Benign Adenomatous Polyp Of The Large Intestine  5 year plan        Luiza Gannon MD  1/24/2017    Chief Complaint:  Chief Complaint   Patient presents with     Annual Exam       History of Present Illness:  Vida is a 67 y.o. female who is here today for a physical examination.  Of note, she has absolutely no chief complaints.  She is currently working as a nurse  part-time.  She is doing some  for her daughter.  She feels quite well.    Health maintenance is reviewed.  She is due for mammography, bone densitometry.  Dental care is scheduled for this afternoon.  She is up-to-date on dermatologic and ophthalmologic care.  Immunizations will be updated today.    Lifestyle is reviewed.  She realizes that she could \"lose a few pounds \".  She is not as vigilant with exercise as she would like to be.  She will be working on this.  She is using a gliding machine that she has her home.  She enjoys " "walking.    Review of Systems:   Aside from that mentioned above, The rest of the review of systems are negative for all systems.    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  She is a nonsmoker  History   Smoking Status     Never Smoker   Smokeless Tobacco     Not on file       Past Medical History: Unremarkable  No Known Allergies    Family History: Her mother who had dementia recently passed away at age 90.  Her father had premature coronary disease.  There are no new cancers in the family  Family History   Problem Relation Age of Onset     No Medical Problems Mother      No Medical Problems Sister      No Medical Problems Daughter      No Medical Problems Maternal Grandmother      No Medical Problems Maternal Grandfather      No Medical Problems Paternal Grandmother      No Medical Problems Paternal Grandfather      No Medical Problems Maternal Aunt      No Medical Problems Paternal Aunt      BRCA 1/2 Neg Hx      Breast cancer Neg Hx      Cancer Neg Hx      Colon cancer Neg Hx      Endometrial cancer Neg Hx      Ovarian cancer Neg Hx      Coronary artery disease Father      And Grandpa      Celiac disease Daughter      &Brother       Physical Exam:  Vitals:    01/24/17 0736   BP: 118/68   Patient Site: Left Arm   Patient Position: Sitting   Cuff Size: Adult Regular   Pulse: 72   Weight: 185 lb (83.9 kg)   Height: 5' 7.25\" (1.708 m)     Wt Readings from Last 3 Encounters:   01/24/17 185 lb (83.9 kg)   07/07/15 187 lb (84.8 kg)       General Appearance:  Alert, cooperative, no distress, appears stated age   Head:  Normocephalic, without obvious abnormality, atraumatic   Eyes:  PERRL, conjunctiva/corneas clear, EOM's intact   Ears:  Normal TM's and external ear canals, both ears   Nose: Nares normal, septum midline,mucosa normal, no drainage    Throat: Lips, mucosa, and tongue normal; teeth and gums normal   Neck: Supple, symmetrical, trachea midline, no adenopathy;  thyroid: " not enlarged, symmetric, no tenderness/mass/nodules; no carotid bruit or JVD   Back:   Symmetric, no curvature, ROM normal, no CVA tenderness   Lungs:   Clear to auscultation bilaterally, respirations unlabored   Heart:  Regular rate and rhythm, S1 and S2 normal, no murmur, rub, or gallop   Abdomen:   Soft, non-tender, bowel sounds active all four quadrants,  no masses, no organomegaly, no hernias    Extremities: Extremities normal, atraumatic, no cyanosis or edema   Skin: Skin color, texture, turgor normal, no rashes or lesions   Lymph nodes: Cervical, supraclavicular, and axillary nodes normal   Neurologic: Normal, CN 2-12 intact                                    BREASTS:  Examined.  Within normal limits bilaterally    Medications:  Current Outpatient Prescriptions   Medication Sig Dispense Refill     cholecalciferol, vitamin D3, (VITAMIN D3) 2,000 unit cap Take 1 tablet by mouth daily.       PROPYLENE GLYCOL/ (SYSTANE ULTRA OPHT) Apply to eye 2 (two) times a day.       No current facility-administered medications for this visit.        Immunizations:  Immunization History   Administered Date(s) Administered     Influenza, inj, historic 10/01/2016     Tdap 04/15/2007     ZOSTER 10/15/2013

## 2021-06-13 NOTE — PROGRESS NOTES
[facility] Note  Patient Name: Vida Najera  Patient Age: 67 y.o.  YOB: 1950  MRN: [id number]  ?  Date of Visit: 10/23/2017  Reason for Office Visit:   Chief Complaint   Patient presents with     Cough     x 1 week with low fever      Diarrhea     Emesis     HPI: Vida Najera 67 y.o. female who presents to clinic diarrhea, vomiting, cough cold symptoms, symptoms started around 10/18 when returning from [place]. Her family members had similar symptoms. Last night temp of 100.6. She had one episode of bloody mucus loose stools. Productive cough, whitish mucus. Post nasal drip. No wheezing. No more vomiting or diarrhea. No abdominal pain. 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       Review of Systems: As noted in HPI     Current Scheduled Meds:  Outpatient Encounter Prescriptions as of 10/23/2017   Medication Sig Dispense Refill     cholecalciferol, vitamin D3, (VITAMIN D3) 2,000 unit cap Take 1 tablet by mouth daily.       PROPYLENE GLYCOL/ (SYSTANE ULTRA OPHT) Apply to eye 2 (two) times a day.       travoprost (TRAVATAN Z) 0.004 % ophthalmic drops 1 drop bedtime.       amoxicillin-clavulanate (AUGMENTIN) 875-125 mg per tablet Take 1 tablet by mouth 2 (two) times a day. 14 tablet 0     BRINZOLAMIDE/BRIMONIDINE TART (SIMBRINZA OPHT) Apply to eye.       ketotifen (ZADITOR/ZYRTEC ITCHY EYES) 0.025 % (0.035 %) ophthalmic solution 1 drop to affected eye every 8 hours as needed for itching or irritation - Max of 2 doses in 24 hours 5 mL 0     No facility-administered encounter medications on file as of 10/23/2017.        Objective / Physical Examination:  /66  Pulse 99  Temp 98.8  F (37.1  C) (Oral)   Wt 179 lb 11.2 oz (81.5 kg)  LMP 05/07/2005  SpO2 98%  BMI 27.94 kg/m2  Wt Readings from Last 3 Encounters:   10/23/17 179 lb 11.2 oz (81.5 kg)   02/11/17 184 lb 1.6 oz (83.5 kg)   01/24/17 185 lb (83.9 kg)     Body mass index  is 27.94 kg/(m^2). (>25?)    General Appearance: Alert and oriented in no acute distress  Head: sinuses NT  Ears: Tympanic membrane clear with landmarks well visualized bilaterally  Eyes: Conjunctivae clear   Nose: Septum midline, nares patent, mucosa moist and without drainage  Throat: Lips and mucosa moist pharynx without erythema or exudate  Neck: Supple, trachea midline. No cervical adenopathy. No Thyromegaly   Lungs: Clear to auscultation bilaterally. Normal inspiratory and expiratory effort. No w/r/r  Cardiovascular: RRR S1, S2. No m/r/g  Abdomen: Bowel sounds active all four quadrants. Soft, non-tender.   Integumentary: Warm and dry.  Neuro: Alert and oriented, follows commands appropriately.    Assessment / Plan / Medical Decision Making:      Encounter Diagnoses   Name Primary?     Cough Yes     Fever      URI (upper respiratory infection)         1. Cough  2. Fever  3. URI (upper respiratory infection)    Likely viral. Exam unremarkable. Recommend supportive cares, rest, immune system support. she may take some time off work if she needs to recoup.     Return if symptoms worsen or fail to improve. earlier if needed.    Total time spent with patient was 15 minutes with >50% of time spent in face-to-face counseling regarding the above plan     Rahul Gordillo MD  [facility]

## 2021-06-15 NOTE — PROGRESS NOTES
Assessment and Plan:   Wellness forms reviewed.  No new needs identified  Patient has been advised of split billing requirements and indicates understanding: No  1. Medicare annual wellness visit, subsequent  Forms reviewed-independent-no needs identified    2. Encounter for preventive care  Up-to-date on colon cancer screening and foot care.  Dermatologic care is up-to-date.  Mammography is scheduled.  She is up-to-date on all immunizations.  Weight is up about 4 pounds-but she remains active exercising 3 days a week and taking care of grandchildren  - HM2(CBC w/o Differential)  - Comprehensive Metabolic Panel  - Glycosylated Hemoglobin A1c    3. Essential Hypertriglyceridemia  Update labs  - Lipid Cascade FASTING  - Thyroid Cascade  - Glycosylated Hemoglobin A1c    4. Osteopenia  We will update labs-low bone mass with moderate fracture risk on DEXA  - Vitamin D, Total (25-Hydroxy)    5. Benign neoplasm of colon, unspecified part of colon  Noted    6. Encounter for laboratory testing for COVID-19 virus  Would like Covid testing prior to travel to [place] and upon return  - Asymptomatic COVID-19 Virus (CORONAVIRUS) PCR; Future      The patient's current medical problems were reviewed.      The following health maintenance schedule was reviewed with the patient and provided in printed form in the after visit summary:   Health Maintenance   Topic Date Due     MEDICARE ANNUAL WELLNESS VISIT  03/02/2022     FALL RISK ASSESSMENT  03/02/2022     MAMMOGRAM  08/05/2022     LIPID  01/17/2025     ADVANCE CARE PLANNING  01/17/2025     TD 18+ HE  09/07/2028     COLORECTAL CANCER SCREENING  06/25/2029     DEXA SCAN  02/18/2035     HEPATITIS C SCREENING  Completed     Pneumococcal Vaccine: 65+ Years  Completed     INFLUENZA VACCINE RULE BASED  Completed     ZOSTER VACCINES  Completed     COVID-19 Vaccine  Completed     Pneumococcal Vaccine: Pediatrics (0 to 5 Years) and At-Risk Patients (6 to 64 Years)  Aged Out      HEPATITIS B VACCINES  Aged Out        Subjective:   Chief Complaint: Vida Najera is an 71 y.o. female here for an Annual Wellness visit.   HPI: Vida is a delightful 71-year-old female who enjoys excellent health.  She is here for a wellness visit.  She has absolutely no chief complaints.    She is keeping busy during the pandemic providing childcare for her 3 grandchildren.  She states this keeps her active.    She and her  exercise regularly.  She did gain 4 pounds.  She would like to get outside more often.    Health maintenance is reviewed and updated in the chart    Review of Systems:    Please see above.  The rest of the review of systems are negative for all systems.    Patient Care Team:  Luiza Gannon MD as PCP - General  Mike Paige MD as Physician (Ophthalmology)  Luiza Gannon MD as Assigned PCP  Rebecca Fabian MD (Ophthalmology)     Patient Active Problem List   Diagnosis     Benign Adenomatous Polyp Of The Large Intestine     Essential Hypertriglyceridemia     Osteopenia     Menopause Has Occurred     History reviewed. No pertinent past medical history.   Past Surgical History:   Procedure Laterality Date     BREAST BIOPSY Right 2002     HYSTERECTOMY  2005     OOPHORECTOMY Bilateral 2005      Family History   Problem Relation Age of Onset     No Medical Problems Mother      No Medical Problems Sister      No Medical Problems Daughter      No Medical Problems Maternal Grandmother      No Medical Problems Maternal Grandfather      No Medical Problems Paternal Grandmother      No Medical Problems Paternal Grandfather      No Medical Problems Maternal Aunt      No Medical Problems Paternal Aunt      Coronary artery disease Father         And Grandpa      Celiac disease Daughter         &Brother     BRCA 1/2 Neg Hx      Breast cancer Neg Hx      Cancer Neg Hx      Colon cancer Neg Hx      Endometrial cancer Neg Hx      Ovarian cancer Neg Hx       Social History      Socioeconomic History     Marital status:      Spouse name: Not on file     Number of children: Not on file     Years of education: Not on file     Highest education level: Not on file   Occupational History     Not on file   Social Needs     Financial resource strain: Not on file     Food insecurity     Worry: Not on file     Inability: Not on file     Transportation needs     Medical: Not on file     Non-medical: Not on file   Tobacco Use     Smoking status: Never Smoker     Smokeless tobacco: Never Used   Substance and Sexual Activity     Alcohol use: Not on file     Drug use: Not on file     Sexual activity: Not on file   Lifestyle     Physical activity     Days per week: Not on file     Minutes per session: Not on file     Stress: Not on file   Relationships     Social connections     Talks on phone: Not on file     Gets together: Not on file     Attends [employer] service: Not on file     Active member of club or organization: Not on file     Attends meetings of clubs or organizations: Not on file     Relationship status: Not on file     Intimate partner violence     Fear of current or ex partner: Not on file     Emotionally abused: Not on file     Physically abused: Not on file     Forced sexual activity: Not on file   Other Topics Concern     Not on file   Social History Narrative     Not on file      Current Outpatient Medications   Medication Sig Dispense Refill     acyclovir (ZOVIRAX) 800 MG tablet Take 800 mg by mouth 2 (two) times a day.       cholecalciferol, vitamin D3, (VITAMIN D3) 2,000 unit cap Take 1 tablet by mouth once a week.        PROPYLENE GLYCOL/ (SYSTANE ULTRA OPHT) Apply to eye 2 (two) times a day.       timoloL maleate (TIMOPTIC) 0.5 % ophthalmic solution        vit C,Z-Cg-gpcdm-lutein-zeaxan (PRESERVISION AREDS-2) 250-90-40-1 mg cap        No current facility-administered medications for this visit.       Objective:   Vital Signs:   Visit Vitals  /70 (Patient  "Site: Left Arm, Patient Position: Sitting, Cuff Size: Adult Regular)   Pulse 82   Resp 18   Ht 5' 7\" (1.702 m)   Wt 184 lb 8 oz (83.7 kg)   LMP 05/07/2005   SpO2 98%   BMI 28.90 kg/m           VisionScreening:  No exam data present     PHYSICAL EXAM  EYES: Eyelids, conjunctiva, and sclera were normal. Pupils were normal. Cornea, iris, and lens were normal bilaterally.  HEAD, EARS, NOSE, MOUTH, AND THROAT: Head and face were normal. Hearing was normal to voice and the ears were normal to external exam.   TMs were normal.  NECK: Neck appearance was normal. There were no neck masses and the thyroid was not enlarged.  RESPIRATORY: Breathing pattern was normal and the chest moved symmetrically.   Lung sounds were equal bilaterally.  CARDIOVASCULAR: Heart rate and rhythm were normal.  S1 and S2 were normal and there were no extra sounds or murmurs. Peripheral pulses in arms and legs were normal.  Jugular venous pressure was normal.  There was no peripheral edema.  GASTROINTESTINAL: The abdomen was normal in contour.  Bowel sounds were present.   Palpation detected no tenderness, mass, or enlarged organs.   MUSCULOSKELETAL: Skeletal configuration was normal and muscle mass was normal for age. Joint appearance was overall normal.  LYMPHATIC: There were no enlarged nodes.  SKIN/HAIR/NAILS: Skin color was normal.  There were no abnormal skin lesions.  Hair and nails were normal.  NEUROLOGIC: The patient was alert and oriented to person, place, time, and circumstance. Speech was normal. Cranial nerves were normal. Motor strength was normal for age. The patient was normally coordinated.  PSYCHIATRIC:  Mood and affect were normal and the patient had normal recent and remote memory. The patient's judgment and insight were normal.  BREASTS: Carefully examined.  No masses noted very adenopathy        Assessment Results 3/2/2021   Activities of Daily Living No help needed   Instrumental Activities of Daily Living No help needed "   Get Up and Go Score Less than 12 seconds   Mini Cog Total Score 5   Some recent data might be hidden     A Mini-Cog score of 0-2 suggests the possibility of dementia, score of 3-5 suggests no dementia    Identified Health Risks:     Patient's advanced directive was discussed and I am comfortable with the patient's wishes.

## 2021-06-15 NOTE — PATIENT INSTRUCTIONS - HE
Advance Directive  Patient s advance directive was discussed and I am comfortable with the patient s wishes.  Patient Education   Personalized Prevention Plan  You are due for the preventive services outlined below.  Your care team is available to assist you in scheduling these services.  If you have already completed any of these items, please share that information with your care team to update in your medical record.  Health Maintenance   Topic Date Due     MEDICARE ANNUAL WELLNESS VISIT  03/02/2022     FALL RISK ASSESSMENT  03/02/2022     MAMMOGRAM  08/05/2022     LIPID  01/17/2025     ADVANCE CARE PLANNING  03/02/2026     TD 18+ HE  09/07/2028     COLORECTAL CANCER SCREENING  06/25/2029     DEXA SCAN  02/18/2035     HEPATITIS C SCREENING  Completed     Pneumococcal Vaccine: 65+ Years  Completed     INFLUENZA VACCINE RULE BASED  Completed     ZOSTER VACCINES  Completed     COVID-19 Vaccine  Completed     Pneumococcal Vaccine: Pediatrics (0 to 5 Years) and At-Risk Patients (6 to 64 Years)  Aged Out     HEPATITIS B VACCINES  Aged Out

## 2021-06-16 NOTE — PROGRESS NOTES
Internal Medicine Office Visit  [facility]   Patient Name: Vida Najera  Patient Age: 71 y.o.  YOB: 1950  MRN: [id number]    Date of Visit: 3/22/2021  Reason for Office Visit:   Chief Complaint   Patient presents with     Rash     X Friday started on upper back, moved to chest/stomach and scalp is itchy. red, raised bumps, itchy           Assessment / Plan / Medical Decision Making:    Problem List Items Addressed This Visit     None      Visit Diagnoses     Urticaria    -  Primary    Relevant Medications    loratadine (CLARITIN) 10 mg tablet         Uncertain etiology, cannot identify any new exposures. Advised use of loratadine 10 mg twice daily for the next 7 to 14 days.  If her symptoms are not alleviated, she can call the office for a prednisone taper starting at 40 mg and decreasing by 10 mg every 3 days.  She may also continue with over-the-counter steroid cream such as the Cera-Vu that she was using previously.  Follow-up if worsening or failure to improve.    I am having Vida Najera start on loratadine. I am also having her maintain her PROPYLENE GLYCOL/ (SYSTANE ULTRA OPHT), timoloL maleate, PreserVision AREDS-2, acyclovir, and cholecalciferol (vitamin D3).          No orders of the defined types were placed in this encounter.  Followup: Return in about 1 week (around 3/29/2021), or if symptoms worsen or fail to improve. earlier if needed.        Tatyana Recinos, NANCY        HPI:  Vida Najera is a 71 y.o. year old who presents to the office today for complaints of an itchy rash.  She was in [place] 3 last Thursday.  On Friday she started to feel itching on the upper back and neck area.  She then noted the same itching and a rash on the chest and abdomen.  She denies changes in detergent, lotion, body wash.  Her  is in her household and has not had a similar appearing rash or symptoms.  She denies any constitutional symptoms.  She is been using a  topical steroid lotion.  There is temporarily relief with this treament.        Health Maintenance Review  Health Maintenance   Topic Date Due     MEDICARE ANNUAL WELLNESS VISIT  03/02/2022     FALL RISK ASSESSMENT  03/02/2022     MAMMOGRAM  03/22/2023     LIPID  03/02/2026     ADVANCE CARE PLANNING  03/02/2026     TD 18+ HE  09/07/2028     COLORECTAL CANCER SCREENING  06/25/2029     DEXA SCAN  02/18/2035     HEPATITIS C SCREENING  Completed     Pneumococcal Vaccine: 65+ Years  Completed     INFLUENZA VACCINE RULE BASED  Completed     ZOSTER VACCINES  Completed     COVID-19 Vaccine  Completed     Pneumococcal Vaccine: Pediatrics (0 to 5 Years) and At-Risk Patients (6 to 64 Years)  Aged Out     HEPATITIS B VACCINES  Aged Out       Current Scheduled Meds:  Outpatient Encounter Medications as of 3/22/2021   Medication Sig Dispense Refill     acyclovir (ZOVIRAX) 400 MG tablet Take 400 mg by mouth 2 (two) times a day.       cholecalciferol, vitamin D3, 50 mcg (2,000 unit) capsule 1 capsule daily.       PROPYLENE GLYCOL/ (SYSTANE ULTRA OPHT) Apply to eye 2 (two) times a day.       timoloL maleate (TIMOPTIC) 0.5 % ophthalmic solution Administer 1 drop into the left eye daily.        vit C,X-Ac-gnkse-lutein-zeaxan (PRESERVISION AREDS-2) 250-90-40-1 mg cap        loratadine (CLARITIN) 10 mg tablet Take 1 tablet (10 mg total) by mouth 2 (two) times a day for 14 days. 28 tablet 0     [DISCONTINUED] acyclovir (ZOVIRAX) 800 MG tablet Take 800 mg by mouth 2 (two) times a day.       [DISCONTINUED] cholecalciferol, vitamin D3, (VITAMIN D3) 2,000 unit cap Take 1 tablet by mouth once a week.        No facility-administered encounter medications on file as of 3/22/2021.          Objective / Physical Examination:  Vitals:    03/22/21 1453   BP: 126/76   Pulse: 83   SpO2: 97%   Weight: 180 lb (81.6 kg)     Wt Readings from Last 3 Encounters:   03/22/21 180 lb (81.6 kg)   03/02/21 184 lb 8 oz (83.7 kg)   01/17/20 180 lb 4.8 oz  (81.8 kg)     Body mass index is 28.19 kg/m .     Constitutional: In no apparent distress  Eyes: non-icteric  Card: RRR  Pulm: clear to ascultation  Skin: Slightly raised, erythematous plaques on the chest, upper, and lower back. Linear erythematous marks from scratching noted low back

## 2021-06-17 NOTE — PATIENT INSTRUCTIONS - HE
Patient Instructions by Luiza Gannon MD at 1/17/2020  7:20 AM     Author: Luiza Gannon MD Service: -- Author Type: Physician    Filed: 1/17/2020  7:44 AM Encounter Date: 1/17/2020 Status: Signed    : Luiza Gannon MD (Physician)         Patient Education     Exercise for a Healthier Heart  You may wonder how you can improve the health of your heart. If youre thinking about exercise, youre on the right track. You dont need to become an athlete, but you do need a certain amount of brisk exercise to help strengthen your heart. If you have been diagnosed with a heart condition, your doctor may recommend exercise to help stabilize your condition. To help make exercise a habit, choose safe, fun activities.       Be sure to check with your health care provider before starting an exercise program.    Why exercise?  Exercising regularly offers many healthy rewards. It can help you do all of the following:    Improve your blood cholesterol levels to help prevent further heart trouble    Lower your blood pressure to help prevent a stroke or heart attack    Control diabetes, or reduce your risk of getting this disease    Improve your heart and lung function    Reach and maintain a healthy weight    Make your muscles stronger and more limber so you can stay active    Prevent falls and fractures by slowing the loss of bone mass (osteoporosis)    Manage stress better  Exercise tips  Ease into your routine. Set small goals. Then build on them.  Exercise on most days. Aim for a total of 150 or more minutes of moderate to  vigorous intensity activity each week. Consider 40 minutes, 3 to 4 times a week. For best results, activity should last for 40 minutes on average. It is OK to work up to the 40 minute period over time. Examples of moderate-intensity activity is walking one mile in 15 minutes or 30 to 45 minutes of yard work.  Step up your daily activity level. Along with your exercise program, try being  more active throughout the day. Walk instead of drive. Do more household tasks or yard work.  Choose one or more activities you enjoy. Walking is one of the easiest things you can do. You can also try swimming, riding a bike, or taking an exercise class.  Stop exercising and call your doctor if you:    Have chest pain or feel dizzy or lightheaded    Feel burning, tightness, pressure, or heaviness in your chest, neck, shoulders, back, or arms    Have unusual shortness of breath    Have increased joint or muscle pain    Have palpitations or an irregular heartbeat      [id number] [employer]. [address] [place] [address]. All rights reserved. This information is not intended as a substitute for professional medical care. Always follow your healthcare professional's instructions.           Advance Directive  Patients advance directive was discussed and I am comfortable with the patients wishes.  Patient Education   Personalized Prevention Plan  You are due for the preventive services outlined below.  Your care team is available to assist you in scheduling these services.  If you have already completed any of these items, please share that information with your care team to update in your medical record.  Health Maintenance   Topic Date Due   ? TD 18+ HE  04/15/2017   ? DXA SCAN  03/01/2019   ? MAMMOGRAM  12/15/2020   ? MEDICARE ANNUAL WELLNESS VISIT  01/17/2021   ? FALL RISK ASSESSMENT  01/17/2021   ? LIPID  08/14/2023   ? ADVANCE CARE PLANNING  08/14/2023   ? COLONOSCOPY  06/25/2029   ? HEPATITIS C SCREENING  Completed   ? PNEUMOCOCCAL IMMUNIZATION 65+ LOW/MEDIUM RISK  Completed   ? INFLUENZA VACCINE RULE BASED  Completed   ? ZOSTER VACCINES  Completed

## 2021-06-19 NOTE — PROGRESS NOTES
Assessment and Plan:   Annual wellness forms reviewed.  No specific needs identified    1. Routine health maintenance  She will schedule a routine mammogram.  Colonoscopy is due next year.  Ophthalmologic, dental and Derm care are up-to-date.  Lifestyle reviewed.  Encourage regular exercise and lower starch eating.  She will also need a tetanus shot.  Future orders will be placed as she is getting a 23 valent pneumonia vaccine and shingrix today  - Varicella Zoster, Recombinant Vaccine IM  - Varicella Zoster, Recombinant Vaccine IM; Future  - Pneumococcal polysaccharide vaccine 23-valent 1 yo or older, subq/IM  - HM2(CBC w/o Differential)  - Comprehensive Metabolic Panel  - Lipid Cascade FASTING    2. Essential Hypertriglyceridemia  Have discussed the importance of reduction of starches and sweets.  Will update  - Comprehensive Metabolic Panel  - Lipid Cascade FASTING    3. Osteopenia  With low risk bone density in 2017  - Vitamin D, Total (25-Hydroxy)     The patient's current medical problems were reviewed.    The following high BMI interventions were performed this visit: prescribed dietary intake  The following health maintenance schedule was reviewed with the patient and provided in printed form in the after visit summary:   Health Maintenance   Topic Date Due     PNEUMOCOCCAL POLYSACCHARIDE VACCINE AGE 65 AND OVER  01/06/2016     TD 18+ HE  04/15/2017     INFLUENZA VACCINE RULE BASED (1) 08/01/2018     MAMMOGRAM  02/15/2019     DXA SCAN  03/01/2019     FALL RISK ASSESSMENT  08/14/2019     ADVANCE DIRECTIVES DISCUSSED WITH PATIENT  07/07/2020     COLONOSCOPY  07/02/2024     PNEUMOCOCCAL CONJUGATE VACCINE FOR ADULTS (PCV13 OR PREVNAR)  Completed     ZOSTER VACCINE  Completed        Subjective:   Chief Complaint: Vida Najera is an 68 y.o. female here for an Annual Wellness visit.   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  Of note, in general, she enjoys good health.  She had metatarsalgia earlier in the year that is improving.  She has also recently been diagnosed with glaucoma.  Other than that, there are no new complaints.    Health maintenance is reviewed and updated in the electronic health record.    Review of Systems: She denies chest pain, shortness of breath or any new bowel or bladder issues.  Incidentally, she reports that she had a bout of bloody diarrhea associated with food poisoning during travel.  She is due for colonoscopy next year.  Please see above.  The rest of the review of systems are negative for all systems.    Patient Care Team:  Luiza Gannon MD as PCP - General  Mike Paige MD as Physician (Ophthalmology)     Patient Active Problem List   Diagnosis     Benign Adenomatous Polyp Of The Large Intestine     Essential Hypertriglyceridemia     Osteopenia     Menopause Has Occurred     No past medical history on file.   Past Surgical History:   Procedure Laterality Date     BREAST BIOPSY Right 2002     HYSTERECTOMY  2005     OOPHORECTOMY Bilateral 2005      Family History   Problem Relation Age of Onset     No Medical Problems Mother      No Medical Problems Sister      No Medical Problems Daughter      No Medical Problems Maternal Grandmother      No Medical Problems Maternal Grandfather      No Medical Problems Paternal Grandmother      No Medical Problems Paternal Grandfather      No Medical Problems Maternal Aunt      No Medical Problems Paternal Aunt      Coronary artery disease Father      And Grandpa      Celiac disease Daughter      &Brother     BRCA 1/2 Neg Hx      Breast cancer Neg Hx      Cancer Neg Hx      Colon cancer Neg Hx      Endometrial cancer Neg Hx      Ovarian cancer Neg Hx       Social History     Social History     Marital status:      Spouse name: N/A     Number of children: N/A     Years of education: N/A     Occupational History     Not on file.     Social  "History Main Topics     Smoking status: Never Smoker     Smokeless tobacco: Never Used     Alcohol use Not on file     Drug use: Not on file     Sexual activity: Not on file     Other Topics Concern     Not on file     Social History Narrative      Current Outpatient Prescriptions   Medication Sig Dispense Refill     aspirin 81 MG EC tablet Take 81 mg by mouth daily.       calcium carbonate/vitamin D3 (CALCIUM 600 + D,3, ORAL) Take by mouth daily.       cholecalciferol, vitamin D3, (VITAMIN D3) 2,000 unit cap Take 1 tablet by mouth once a week.        doxycycline (MONODOX) 50 MG capsule Take 50 mg by mouth daily.       PROPYLENE GLYCOL/ (SYSTANE ULTRA OPHT) Apply to eye 2 (two) times a day.       travoprost (TRAVATAN Z) 0.004 % ophthalmic drops 1 drop bedtime.       No current facility-administered medications for this visit.       Objective:   Vital Signs:   Visit Vitals     /64 (Patient Site: Left Arm, Patient Position: Sitting, Cuff Size: Adult Regular)     Pulse 72     Ht 5' 7\" (1.702 m)     Wt 181 lb (82.1 kg)     LMP 05/07/2005     BMI 28.35 kg/m2        VisionScreening:  No exam data present     PHYSICAL EXAM  EYES: Eyelids, conjunctiva, and sclera were normal. Pupils were normal. Cornea, iris, and lens were normal bilaterally.  HEAD, EARS, NOSE, MOUTH, AND THROAT: Head and face were normal. Hearing was normal to voice and the ears were normal to external exam. Nose appearance was normal and there was no discharge. Oropharynx was normal.  TMs were normal.  NECK: Neck appearance was normal. There were no neck masses and the thyroid was not enlarged.  RESPIRATORY: Breathing pattern was normal and the chest moved symmetrically.   Lung sounds were equal bilaterally.  CARDIOVASCULAR: Heart rate and rhythm were normal.  S1 and S2 were normal and there were no extra sounds or murmurs. Peripheral pulses in arms and legs were normal.  Jugular venous pressure was normal.  There was no peripheral " edema.  GASTROINTESTINAL: The abdomen was normal in contour.  Bowel sounds were present.   Palpation detected no tenderness, mass, or enlarged organs.   MUSCULOSKELETAL: Skeletal configuration was normal and muscle mass was normal for age. Joint appearance was overall normal.  LYMPHATIC: There were no enlarged nodes.  SKIN/HAIR/NAILS: Skin color was normal.  There were no abnormal skin lesions.  Hair and nails were normal.  NEUROLOGIC: The patient was alert and oriented to person, place, time, and circumstance. Speech was normal. Cranial nerves were normal. Motor strength was normal for age. The patient was normally coordinated.  PSYCHIATRIC:  Mood and affect were normal and the patient had normal recent and remote memory. The patient's judgment and insight were normal.  BREASTS: Examined bilaterally and within normal limits        Assessment Results 8/14/2018   Activities of Daily Living No help needed   Instrumental Activities of Daily Living No help needed   Get Up and Go Score Less than 12 seconds   Mini Cog Total Score 5   Some recent data might be hidden     A Mini-Cog score of 0-2 suggests the possibility of dementia, score of 3-5 suggests no dementia    Identified Health Risks:     Patient's advanced directive was discussed and I am comfortable with the patient's wishes.

## 2021-06-25 NOTE — PROGRESS NOTES
Progress Notes by Crystal Juarez MD at 2/11/2017  8:10 AM     Author: Crystal Juarez MD Service: -- Author Type: Physician    Filed: 2/11/2017 10:20 AM Encounter Date: 2/11/2017 Status: Signed    : Crystal Juarez MD (Physician)       Provider wore a mask during this visit.   Subjective:   Vida Najera is a 67 y.o. female  Roomed by: maria fernanda    Refills needed? No    Do you have any forms that need to be filled out? No      Chief Complaint   Patient presents with   ? Cough     x one week   ? Facial Pain     pressure   ? Fever     low grade   Cold symptoms started on 2/2. Then 4 days ago started to have more coughing, post nasal drip, fever up 100.5, fatigue, headache, and right facial pain. Denies any CP or SOB. No sleeping well, and waking up coughing. Stopped taking her baby aspirin when she started to get some bloody noses. Was seen 2/7 by ophthalmologist and diagnosed with glaucoma, and started on 2 different eye drops. Her next appt with ophtho is 3/1. Says by 2/9 started getting more mattering in her eyes. Says has needed to keep wiping the goop in her eyes, and admits some fuzziness in her vision. Says right eye itches. Denies nausea, vomiting, diarrhea or belly pain.     Review of Systems  Const - Resp - see HPI  No Known Allergies    Current Outpatient Prescriptions:   ?  BRINZOLAMIDE/BRIMONIDINE TART (SIMBRINZA OPHT), Apply to eye., Disp: , Rfl:   ?  cholecalciferol, vitamin D3, (VITAMIN D3) 2,000 unit cap, Take 1 tablet by mouth daily., Disp: , Rfl:   ?  PROPYLENE GLYCOL/ (SYSTANE ULTRA OPHT), Apply to eye 2 (two) times a day., Disp: , Rfl:   ?  travoprost (TRAVATAN Z) 0.004 % ophthalmic drops, 1 drop bedtime., Disp: , Rfl:   Patient Active Problem List   Diagnosis   ? Benign Adenomatous Polyp Of The Large Intestine   ? Essential Hypertriglyceridemia   ? Osteopenia   ? Menopause Has Occurred     Medical History Reviewed  Objective:     Vitals:    02/11/17 0816   BP: 130/70   Pulse: (!)  106   Resp: 16   Temp: 99.2  F (37.3  C)   TempSrc: Oral   SpO2: 96%   Weight: 184 lb 1.6 oz (83.5 kg)      Visual Acuity Screening    Right eye Left eye Both eyes   Without correction:      With correction: 20/40 20/70 20/40     Vision noted   Gen - Pt in NAD  Eyes - PERRL, EOMI  Right conjunctiva - bulba - moderately injected, tarsal - moderately injected - moderate mattering  Left conjunctiva - bulbar - mildly injected, tarsal  - mildly injected - scant mattering  No photophobia  Face - mildly TTP over right axillary and non TTP frontal sinus areas  Nose - mildly congested  Pharynx - non injected, tonsils 1+ size  Neck - supple, no cervical adenopathy  Cor - RRR w/o murmur  Lungs - Moderate air entry; no wheezes, some coarse crackles noted on left lung base on auscultation - rare dry coughing noted    Xr Chest Pa And Lateral    Result Date: 2/11/2017  XR CHEST PA AND LATERAL2/11/2017 8:46 AMINDICATION: Acute maxillary sinusitis, unspecifiedCOMPARISON: None.FINDINGS: Negative chest.This report was electronically interpreted by: Dr. Chi Washington MD ON 02/11/2017 at 08:56  Radiologist's report discussed day of visit.     Assessment - Plan   1. Acute non-recurrent maxillary sinusitis  - amoxicillin-clavulanate (AUGMENTIN) 875-125 mg per tablet; Take 1 tablet by mouth 2 (two) times a day.  Dispense: 14 tablet; Refill: 0    2. Viral conjunctivitis  Since patient is already using sustained discussed that she only needed to try this for itching or irritation.  Discussed that her type of conjunctivitis should be self-limiting.  - ketotifen (ZADITOR/ZYRTEC ITCHY EYES) 0.025 % (0.035 %) ophthalmic solution; 1 drop to affected eye every 8 hours as needed for itching or irritation - Max of 2 doses in 24 hours  Dispense: 5 mL; Refill: 0    3. Cough  - XR Chest PA and Lateral; Future    At the conclusion of the encounter, assessment and plan were discussed.   All questions were answered.   The patient or guardian acknowledged  understanding and was involved in the decision making regarding the overall care plan.    Patient Instructions     1. Keep well hydrated  2. If symptoms not improved after completing antibiotics, follow up with primary  3. If you have any questions, call the clinic number    Acute Bacterial Rhinosinusitis (ABRS)  Acute bacterial rhinosinusitis (ABRS) is an infection of your nasal cavity and sinuses. Its caused by bacteria. Acute means that youve had symptoms for less than 12 weeks.  Understanding your sinuses  The nasal cavity is the large air-filled space behind your nose. The sinuses are a group of spaces formed by the bones of your face. They connect with your nasal cavity. ABRS causes the tissue lining these spaces to become inflamed. Mucus may not drain normally. This leads to facial pain and other symptoms.  What causes ABRS?  ABRS most often follows an upper respiratory infection caused by a virus. Bacteria then infect the lining of your nasal cavity and sinuses. But you can also get ABRS if you have:    Nasal allergies    Long-term nasal swelling and congestion not caused by allergies    Blockage in the nose  Symptoms of ABRS  The symptoms of ABRS may be different for each person, and can include:    Nasal congestion    Runny nose    Fluid draining from the nose down the throat (postnasal drip)    Headache    Cough    Pain in the sinuses    Thick, colored fluid from the nose (mucus)    Fever  Diagnosing ABRS  ABRS may be diagnosed if youve had an upper respiratory infection like a cold and cough for longer than 10 to 14 days. Your health care provider will ask about your symptoms and your medical history. The provider will check your vital signs, including your temperature. Youll have a physical exam. The health care provider will check your ears, nose, and throat. You likely wont need any tests. If ABRS comes back, you may have a culture or other tests.  Treatment for ABRS  Treatment may  include:    Antibiotic medicine. This is for symptoms that last for at least 10 to 14 days.    Nasal corticosteroid medicine. Drops or spray used in the nose can lessen swelling and congestion.    Over-the-counter pain medicine. This is to lessen sinus pain and pressure.    Nasal decongestant medicine. Spray or drops may help to lessen congestion. Do not use them for more than a few days.    Salt wash (saline irrigation). This can help to loosen mucus.  Possible complications of ABRS  ABRS may come back or become long-term (chronic).  In rare cases, ABRS may cause complications such as:     Inflamed tissue around the brain and spinal cord (meningitis)    Inflamed tissue around the eyes (orbital cellulitis)    Inflamed bones around the sinuses (osteitis)  These problems may need to be treated in a hospital with intravenous (IV) antibiotic medicine or surgery.  When to call the health care provider  Call your health care provider if you have any of the following:    Symptoms that dont get better, or get worse    Symptoms that dont get better after 3 to 5 days on antibiotics    Trouble seeing    Swelling around your eyes    Confusion or trouble staying awake        0339-0184 [employer]. [address] [place] [address]. All rights reserved. This information is not intended as a substitute for professional medical care. Always follow your healthcare professional's instructions.      Pink eye symptoms may resolve on it's own in three to seven days. Children with viral conjunctivitis may be contagious for a week or more. Children may return to school when the redness and discharge in their eyes subsides.      Antibiotics for Pink Eye  When you need them--and when you dont     Pink eye is a common condition, especially in children. It is also called conjunctivitis. The eyes are pink because they are infected or irritated. They may be itchy and teary, with a watery discharge, and swollen, crusty  eyelids.  Doctors often prescribe antibiotic eye drops or ointments for pink eye. But antibiotics dont usually help, according to the American Academy of Ophthalmology. They can do more harm than good. Heres why:  Antibiotics are not usually necessary for pink eye.  Pink eye can be caused by a virus, an allergy, or bacteria.  Pink eye is usually caused by a virus. Viral pink eye usually goes away on its own in a week or so. Antibiotics do not kill viruses.  Pink eye can also be an allergic reaction to something like pollen, dust mites, pets, contact lenses, or cosmetics. This kind of pink eye gets better when you avoid the things that are causing the allergy. Antibiotics dont help allergies.  A third type of pink eye is caused by bacteria. This can be helped by an antibiotic. However, mild bacterial pink eye almost always goes away within ten days without medication.  Antibiotics can cause problems.  Antibiotics can cause itching, stinging, burning, swelling and redness. They can cause more discharge. And they can cause allergic reactions in some people.  Antibiotics can be a waste of money.  Generic antibiotic drops and ointments can cost $12 to $60. For newer, brand name drugs, you can pay over $130. And if you have an antibiotic-resistant infection, you will need more doctor visits and costly medicines.  Who should use antibiotics for pink eye?  You might need antibiotic eye drops and ointments for bacterial pink eye if:  Your symptoms are severe.   Your immune system is weak. This might happen if you have another illness.   Your infection does not get better in a week without treatment.  Know the symptoms of different kinds of pink eye.  Viral pink eye: Symptoms can include watery eyes along with a cold, flu, or sore throat.   Allergic pink eye: Symptoms include itchy eyes, swollen eyelids and a runny or itchy nose. It is more common in people who have other allergies, such as hay fever or asthma.   Bacterial  pink eye: Symptoms include a thick, often yellow-green discharge that lasts all day (usually not with a cold or flu).  This report is for you to use when talking with your health-care provider. It is not a substitute for medical advice and treatment. Use of this report is at your own risk.    2013 [employer]. Developed in cooperation with the American Academy of Ophthalmology. To learn more about the sources used in this report and terms and conditions of use, visit [employer]ices.org/about-us/.  10/2013    ADVICE FROM CONSUMER REPORTS  Steps to help manage pink eye and prevent it from spreading  Soothe pink eye.  Use a clean, cool, wet compress. Cooled artificial tears (over-the-counter) may help.   Allergic pink eye can be helped by cooled antihistamine eye drops (prescription or over-the-counter). Keep windows closed during hay-fever season.  Pink eye is contagious. Try to avoid spreading it.  Wash hands often or use an alcohol-based hand .   Dont touch your eyes.   Dont use contact lenses.   Dont re-use washcloths or tissues.   Change pillowcases often.   Wash sheets and towels in hot water and detergent.   Clean or replace eyewear and make-up that could be infected.  Call the doctor if:   You have eye pain or vision problems, or if you are very sensitive to light.   You had glaucoma surgery in the past.   You took antibiotics for bacterial pink eye and it doesnt improve in three to four days.   You have viral pink eye that gets worse after a week.  [employer]  is an initiative of  the [employer].   2016. All rights reserved.   [address] [place] [address], [address]  [place] [address]  Privacy Policy  Contact [facility]

## 2021-07-13 ENCOUNTER — RECORDS - HEALTHEAST (OUTPATIENT)
Dept: ADMINISTRATIVE | Facility: CLINIC | Age: 71
End: 2021-07-13

## 2021-07-15 ENCOUNTER — TRANSFERRED RECORDS (OUTPATIENT)
Dept: HEALTH INFORMATION MANAGEMENT | Facility: CLINIC | Age: 71
End: 2021-07-15

## 2021-07-21 ENCOUNTER — RECORDS - HEALTHEAST (OUTPATIENT)
Dept: ADMINISTRATIVE | Facility: CLINIC | Age: 71
End: 2021-07-21

## 2021-08-04 ENCOUNTER — TRANSFERRED RECORDS (OUTPATIENT)
Dept: HEALTH INFORMATION MANAGEMENT | Facility: CLINIC | Age: 71
End: 2021-08-04
Payer: COMMERCIAL

## 2021-09-12 ENCOUNTER — HEALTH MAINTENANCE LETTER (OUTPATIENT)
Age: 71
End: 2021-09-12

## 2022-01-21 ENCOUNTER — LAB (OUTPATIENT)
Dept: LAB | Facility: CLINIC | Age: 72
End: 2022-01-21
Attending: FAMILY MEDICINE
Payer: COMMERCIAL

## 2022-01-21 DIAGNOSIS — Z20.822 CLOSE EXPOSURE TO 2019 NOVEL CORONAVIRUS: ICD-10-CM

## 2022-01-21 PROCEDURE — U0003 INFECTIOUS AGENT DETECTION BY NUCLEIC ACID (DNA OR RNA); SEVERE ACUTE RESPIRATORY SYNDROME CORONAVIRUS 2 (SARS-COV-2) (CORONAVIRUS DISEASE [COVID-19]), AMPLIFIED PROBE TECHNIQUE, MAKING USE OF HIGH THROUGHPUT TECHNOLOGIES AS DESCRIBED BY CMS-2020-01-R: HCPCS

## 2022-01-21 PROCEDURE — U0005 INFEC AGEN DETEC AMPLI PROBE: HCPCS

## 2022-01-22 LAB — SARS-COV-2 RNA RESP QL NAA+PROBE: NEGATIVE

## 2022-01-26 ENCOUNTER — TELEPHONE (OUTPATIENT)
Dept: INTERNAL MEDICINE | Facility: CLINIC | Age: 72
End: 2022-01-26

## 2022-01-26 DIAGNOSIS — N63.0 BREAST MASS: Primary | ICD-10-CM

## 2022-01-26 DIAGNOSIS — R92.8 ABNORMAL MAMMOGRAM: ICD-10-CM

## 2022-01-26 NOTE — TELEPHONE ENCOUNTER
Reason for Call: Request for an order or referral:    Order or referral being requested:   Diagnostic Bilateral Mammogram   Left breast Ultrasound    For:  Breast mass and followup visit    Date needed: as soon as possible    Has the patient been seen by the PCP for this problem? YES    Additional comments: n/a    Phone number Patient can be reached at:  Cell number on file:    Telephone Information:   Mobile [phone]       Best Time:  No need to call patient    Can we leave a detailed message on this number?  NO    Call taken on 1/26/2022 at 2:27 PM by Krystle Patiño

## 2022-02-07 ENCOUNTER — TRANSFERRED RECORDS (OUTPATIENT)
Dept: HEALTH INFORMATION MANAGEMENT | Facility: CLINIC | Age: 72
End: 2022-02-07
Payer: COMMERCIAL

## 2022-02-14 ENCOUNTER — TRANSFERRED RECORDS (OUTPATIENT)
Dept: HEALTH INFORMATION MANAGEMENT | Facility: CLINIC | Age: 72
End: 2022-02-14
Payer: COMMERCIAL

## 2022-02-28 ENCOUNTER — ANCILLARY PROCEDURE (OUTPATIENT)
Dept: MAMMOGRAPHY | Facility: CLINIC | Age: 72
End: 2022-02-28
Attending: INTERNAL MEDICINE
Payer: COMMERCIAL

## 2022-02-28 DIAGNOSIS — R92.8 ABNORMAL MAMMOGRAM: ICD-10-CM

## 2022-02-28 DIAGNOSIS — N63.0 BREAST MASS: ICD-10-CM

## 2022-02-28 PROCEDURE — 76642 ULTRASOUND BREAST LIMITED: CPT | Mod: LT

## 2022-02-28 PROCEDURE — 77066 DX MAMMO INCL CAD BI: CPT

## 2022-04-04 ASSESSMENT — ACTIVITIES OF DAILY LIVING (ADL): CURRENT_FUNCTION: NO ASSISTANCE NEEDED

## 2022-04-06 ENCOUNTER — OFFICE VISIT (OUTPATIENT)
Dept: INTERNAL MEDICINE | Facility: CLINIC | Age: 72
End: 2022-04-06
Payer: COMMERCIAL

## 2022-04-06 VITALS
HEIGHT: 67 IN | HEART RATE: 66 BPM | OXYGEN SATURATION: 98 % | SYSTOLIC BLOOD PRESSURE: 110 MMHG | DIASTOLIC BLOOD PRESSURE: 74 MMHG | WEIGHT: 190.8 LBS | BODY MASS INDEX: 29.95 KG/M2

## 2022-04-06 DIAGNOSIS — M89.9 DISORDER OF BONE AND CARTILAGE: ICD-10-CM

## 2022-04-06 DIAGNOSIS — Z00.00 ENCOUNTER FOR MEDICARE ANNUAL WELLNESS EXAM: Primary | ICD-10-CM

## 2022-04-06 DIAGNOSIS — M94.9 DISORDER OF BONE AND CARTILAGE: ICD-10-CM

## 2022-04-06 DIAGNOSIS — E55.9 VITAMIN D DEFICIENCY: ICD-10-CM

## 2022-04-06 DIAGNOSIS — Z78.0 MENOPAUSE: ICD-10-CM

## 2022-04-06 DIAGNOSIS — E78.1 PURE HYPERGLYCERIDEMIA: ICD-10-CM

## 2022-04-06 DIAGNOSIS — R79.9 ABNORMAL FINDING OF BLOOD CHEMISTRY, UNSPECIFIED: ICD-10-CM

## 2022-04-06 LAB
ALBUMIN SERPL-MCNC: 3.9 G/DL (ref 3.5–5)
ALP SERPL-CCNC: 102 U/L (ref 45–120)
ALT SERPL W P-5'-P-CCNC: 15 U/L (ref 0–45)
ANION GAP SERPL CALCULATED.3IONS-SCNC: 11 MMOL/L (ref 5–18)
AST SERPL W P-5'-P-CCNC: 16 U/L (ref 0–40)
BASOPHILS # BLD AUTO: 0 10E3/UL (ref 0–0.2)
BASOPHILS NFR BLD AUTO: 0 %
BILIRUB SERPL-MCNC: 0.5 MG/DL (ref 0–1)
BUN SERPL-MCNC: 17 MG/DL (ref 8–28)
CALCIUM SERPL-MCNC: 9.4 MG/DL (ref 8.5–10.5)
CHLORIDE BLD-SCNC: 106 MMOL/L (ref 98–107)
CHOLEST SERPL-MCNC: 243 MG/DL
CO2 SERPL-SCNC: 24 MMOL/L (ref 22–31)
CREAT SERPL-MCNC: 0.77 MG/DL (ref 0.6–1.1)
EOSINOPHIL # BLD AUTO: 0.1 10E3/UL (ref 0–0.7)
EOSINOPHIL NFR BLD AUTO: 1 %
ERYTHROCYTE [DISTWIDTH] IN BLOOD BY AUTOMATED COUNT: 13 % (ref 10–15)
FASTING STATUS PATIENT QL REPORTED: YES
GFR SERPL CREATININE-BSD FRML MDRD: 82 ML/MIN/1.73M2
GLUCOSE BLD-MCNC: 89 MG/DL (ref 70–125)
HBA1C MFR BLD: 5.8 % (ref 0–5.6)
HCT VFR BLD AUTO: 42.5 % (ref 35–47)
HDLC SERPL-MCNC: 48 MG/DL
HGB BLD-MCNC: 14.1 G/DL (ref 11.7–15.7)
IMM GRANULOCYTES # BLD: 0 10E3/UL
IMM GRANULOCYTES NFR BLD: 0 %
LDLC SERPL CALC-MCNC: 147 MG/DL
LYMPHOCYTES # BLD AUTO: 1 10E3/UL (ref 0.8–5.3)
LYMPHOCYTES NFR BLD AUTO: 22 %
MCH RBC QN AUTO: 29.9 PG (ref 26.5–33)
MCHC RBC AUTO-ENTMCNC: 33.2 G/DL (ref 31.5–36.5)
MCV RBC AUTO: 90 FL (ref 78–100)
MONOCYTES # BLD AUTO: 0.5 10E3/UL (ref 0–1.3)
MONOCYTES NFR BLD AUTO: 11 %
NEUTROPHILS # BLD AUTO: 2.9 10E3/UL (ref 1.6–8.3)
NEUTROPHILS NFR BLD AUTO: 66 %
PLATELET # BLD AUTO: 264 10E3/UL (ref 150–450)
POTASSIUM BLD-SCNC: 5.3 MMOL/L (ref 3.5–5)
PROT SERPL-MCNC: 7.1 G/DL (ref 6–8)
RBC # BLD AUTO: 4.72 10E6/UL (ref 3.8–5.2)
SODIUM SERPL-SCNC: 141 MMOL/L (ref 136–145)
TRIGL SERPL-MCNC: 241 MG/DL
TSH SERPL DL<=0.005 MIU/L-ACNC: 3.96 UIU/ML (ref 0.3–5)
WBC # BLD AUTO: 4.4 10E3/UL (ref 4–11)

## 2022-04-06 PROCEDURE — 83036 HEMOGLOBIN GLYCOSYLATED A1C: CPT | Performed by: INTERNAL MEDICINE

## 2022-04-06 PROCEDURE — 80061 LIPID PANEL: CPT | Performed by: INTERNAL MEDICINE

## 2022-04-06 PROCEDURE — 80053 COMPREHEN METABOLIC PANEL: CPT | Performed by: INTERNAL MEDICINE

## 2022-04-06 PROCEDURE — 84443 ASSAY THYROID STIM HORMONE: CPT | Performed by: INTERNAL MEDICINE

## 2022-04-06 PROCEDURE — 36415 COLL VENOUS BLD VENIPUNCTURE: CPT | Performed by: INTERNAL MEDICINE

## 2022-04-06 PROCEDURE — 82306 VITAMIN D 25 HYDROXY: CPT | Performed by: INTERNAL MEDICINE

## 2022-04-06 PROCEDURE — 85025 COMPLETE CBC W/AUTO DIFF WBC: CPT | Performed by: INTERNAL MEDICINE

## 2022-04-06 PROCEDURE — 99397 PER PM REEVAL EST PAT 65+ YR: CPT | Performed by: INTERNAL MEDICINE

## 2022-04-06 ASSESSMENT — ACTIVITIES OF DAILY LIVING (ADL): CURRENT_FUNCTION: NO ASSISTANCE NEEDED

## 2022-04-06 NOTE — PROGRESS NOTES
ANNUAL WELLNESS VISIT--Face to Face    Assessment and Plan:     ASSESSMENT and PLAN:  1. Encounter for Medicare annual wellness exam  She is up-to-date on ophthalmologic, dental and Derm care.  Immunizations are up-to-date and she is contemplating her fourth Covid booster prior to travel.  Colonoscopy due in 2024.  Due for bone density.  Mammography up-to-date.    She is exercising regularly.  She vows to work on diet and eat cleanly.  Goal greater than 10 pounds weight loss.  Screening labs below.    - CBC with Platelets & Differential; Future  - Comprehensive metabolic panel; Future  - Hemoglobin A1c; Future  - Lipid panel reflex to direct LDL Fasting; Future  - TSH with free T4 reflex; Future  - CBC with Platelets & Differential  - Comprehensive metabolic panel  - Hemoglobin A1c  - Lipid panel reflex to direct LDL Fasting  - TSH with free T4 reflex    2. Pure hyperglyceridemia  We will update labs.  Have discussed the possibility of coronary calcium scanning to further stratify cardiovascular risk factors.  She was given information today.  She will contemplate that.    - CBC with Platelets & Differential; Future  - Comprehensive metabolic panel; Future  - Hemoglobin A1c; Future  - Lipid panel reflex to direct LDL Fasting; Future  - TSH with free T4 reflex; Future  - CBC with Platelets & Differential  - Comprehensive metabolic panel  - Hemoglobin A1c  - Lipid panel reflex to direct LDL Fasting  - TSH with free T4 reflex    3. Osteopenia  Due for bone density-have discussed the importance of both weightbearing and balance exercise.  She is taking 2000 IUs of vitamin D3 daily  - DX Hip/Pelvis/Spine; Future    4. Vitamin D deficiency  As above-taking 2000 IUs of vitamin D3 daily  - Vitamin D Deficiency; Future  - DX Hip/Pelvis/Spine; Future  - Vitamin D Deficiency    5. Abnormal finding of blood chemistry, unspecified     - Hemoglobin A1c; Future  - Hemoglobin A1c    6. Menopause    - DX Hip/Pelvis/Spine; Future        Preventive Care Assessed: Preventative care was assessed in its entirety     Medication list carefully reviewed and corrected  Epic Merger Problem list addressed and updated         Subjective:   Vida is a 72 year old female who presents to the clinic today for an Annual Wellness Visit.    CHIEF COMPLAINT:  Chief Complaint   Patient presents with     Wellness Visit       HPI: Vida is a delightful 72-year-old female who is here today for an annual wellness visit.  Of note, she has really absolutely no chief complaints.  She enjoys excellent health.  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  She has nice life balance.  She exercises regularly.  She laments the weight gain that she has had with the pandemic and is hoping to improve.    Her health history is reviewed.  She has had an atypical nevus excised.  She has had a basal cell cancer and is followed by dermatology.    Health maintenance is reviewed and updated in the chart.    Review of Systems: She denies chest pain, shortness of breath, any new bowel or bladder issues.  She denies any specific musculoskeletal issues.  She does not have any hearing issues.    Patient Care Team:  Luiza Gannon MD as PCP - General (Internal Medicine)  Luiza Gannon MD as Assigned PCP     Patient Active Problem List   Diagnosis     Benign Adenomatous Polyp Of The Large Intestine     Essential Hypertriglyceridemia     Osteopenia     Menopause Has Occurred     No past medical history on file.   Past Surgical History:   Procedure Laterality Date     BIOPSY BREAST Right 2002     HYSTERECTOMY  2005     OOPHORECTOMY Bilateral 2005      Family History   Problem Relation Age of Onset     No Known Problems Mother      No Known Problems Sister      No Known Problems Daughter      No Known Problems Maternal Grandmother      No Known Problems Maternal Grandfather      No Known Problems Paternal Grandmother       No Known Problems Paternal Grandfather      No Known Problems Maternal Aunt      No Known Problems Paternal Aunt      Coronary Artery Disease Father         And Grandpa      Celiac Disease Daughter         &Brother     Hereditary Breast and Ovarian Cancer Syndrome No family hx of      Breast Cancer No family hx of      Cancer No family hx of      Colon Cancer No family hx of      Endometrial Cancer No family hx of      Ovarian Cancer No family hx of       Social History     Socioeconomic History     Marital status:      Spouse name: Not on file     Number of children: Not on file     Years of education: Not on file     Highest education level: Not on file   Occupational History     Not on file   Tobacco Use     Smoking status: Never Smoker     Smokeless tobacco: Never Used   Substance and Sexual Activity     Alcohol use: Not on file     Drug use: Not on file     Sexual activity: Not on file   Other Topics Concern     Not on file   Social History Narrative     Not on file     Social Determinants of Health     Financial Resource Strain: Not on file   Food Insecurity: Not on file   Transportation Needs: Not on file   Physical Activity: Not on file   Stress: Not on file   Social Connections: Not on file   Intimate Partner Violence: Not on file   Housing Stability: Not on file      Social History     Social History Narrative     Not on file       Current Outpatient Medications   Medication Sig Dispense Refill     acyclovir (ZOVIRAX) 400 MG tablet [ACYCLOVIR (ZOVIRAX) 400 MG TABLET] Take 400 mg by mouth 2 (two) times a day.       cholecalciferol, vitamin D3, 50 mcg (2,000 unit) capsule [CHOLECALCIFEROL, VITAMIN D3, 50 MCG (2,000 UNIT) CAPSULE] 1 capsule daily.       PROPYLENE GLYCOL/ (SYSTANE ULTRA OPHT) [PROPYLENE GLYCOL/ (SYSTANE ULTRA OPHT)] Apply to eye 2 (two) times a day.       timoloL maleate (TIMOPTIC) 0.5 % ophthalmic solution [TIMOLOL MALEATE (TIMOPTIC) 0.5 % OPHTHALMIC SOLUTION] Administer  "1 drop into the left eye daily.        vit C,U-Td-befxl-lutein-zeaxan (PRESERVISION AREDS-2) 250-90-40-1 mg cap [VIT C,X-KZ-BCBOH-LUTEIN-ZEAXAN (PRESERVISION AREDS-2) 250-90-40-1 MG CAP]         Objective:   /74 (BP Location: Left arm, Patient Position: Sitting, Cuff Size: Adult Large)   Pulse 66   Ht 1.702 m (5' 7\")   Wt 86.5 kg (190 lb 12.8 oz)   SpO2 98%   BMI 29.88 kg/m   Estimated body mass index is 29.88 kg/m  as calculated from the following:    Height as of this encounter: 1.702 m (5' 7\").    Weight as of this encounter: 86.5 kg (190 lb 12.8 oz).    VisionScreening:  No exam data present     PHYSICAL EXAM:  EYES: Eyelids, conjunctiva, and sclera were normal. Pupils were normal. Cornea, iris, and lens were normal bilaterally.  HEAD, EARS, NOSE, MOUTH, AND THROAT: Head and face were normal. Hearing was normal to voice and the ears were normal to external exam. Nose appearance was normal and there was no discharge. Oropharynx was normal.  TMs were normal.  NECK: Neck appearance was normal. There were no neck masses and the thyroid was not enlarged.  RESPIRATORY: Breathing pattern was normal and the chest moved symmetrically.   Lung sounds were equal bilaterally.  CARDIOVASCULAR: Heart rate and rhythm were normal.  S1 and S2 were normal and there were no extra sounds or murmurs. Peripheral pulses in arms and legs were normal.  Jugular venous pressure was normal.  There was no peripheral edema.  GASTROINTESTINAL: The abdomen was normal in contour.  Bowel sounds were present.   Palpation detected no tenderness, mass, or enlarged organs.   MUSCULOSKELETAL: Skeletal configuration was normal and muscle mass was normal for age. Joint appearance was overall normal.  LYMPHATIC: There were no enlarged nodes.  SKIN/HAIR/NAILS: Skin color was normal.  There were no abnormal skin lesions.  Hair and nails were normal.  NEUROLOGIC: The patient was alert and oriented to person, place, time, and circumstance. Speech " "was normal. Cranial nerves were normal. Motor strength was normal for age. The patient was normally coordinated.  PSYCHIATRIC:  Mood and affect were normal and the patient had normal recent and remote memory. The patient's judgment and insight were normal.      A breast exam was performed and no masses, nipple discharge or axillary adenopathy is noted.    Psychiatric:  Alert and oriented         Recent Labs   Lab Test 03/02/21  0822   CHOL 233*   GLC 92       No results found for: VITDT    No flowsheet data found.  Cognitive Screening   1) Repeat 3 items (Leader, Season, Table)    2) Clock draw: NORMAL  3) 3 item recall: Recalls 3 objects  Results: 3 items recalled: COGNITIVE IMPAIRMENT LESS LIKELY    Mini-CogTM Copyright S Crystal. Licensed by the author for use in [facility] [employer]; reprinted with permission (rafi@.[email or web address]). All rights reserved.    A Mini-Cog score of 0-2 suggests the possibility of dementia, score of 3-5 suggests no dementia    ROOMING STAFF:    Patient has been advised of split billing requirements and indicates understanding: Yes   Are you in the first 12 months of your Medicare coverage?  No      Do you feel safe in your environment? Yes      Have you ever done Advance Care Planning? (For example, a Health Directive, POLST, or a discussion with a medical provider or your loved ones about your wishes): Yes, patient states has an Advance Care Planning document and will bring a copy to the clinic.    Healthy Habits:     In general, how would you rate your overall health?  Excellent    Frequency of exercise:  4-5 days/week    Duration of exercise:  15-30 minutes    Do you usually eat at least 4 servings of fruit and vegetables a day, include whole grains    & fiber and avoid regularly eating high fat or \"junk\" foods?  Yes    Taking medications regularly:  Yes    Medication side effects:  None    Ability to successfully perform activities of daily living:  No assistance needed    Home Safety:  " No safety concerns identified    Hearing Impairment:  No hearing concerns    In the past 6 months, have you been bothered by leaking of urine?  No    In general, how would you rate your overall mental or emotional health?  Excellent      PHQ-2 Total Score: 0    Additional concerns today:  Yes          The patient's current medical problems were reviewed.        Weight management plan: Discussed healthy diet and exercise guidelines    The following health maintenance items are reviewed in Epic and correct as of today:  Health Maintenance Due   Topic Date Due     ANNUAL REVIEW OF HM ORDERS  Never done       Appropriate preventive services were discussed with this patient, including applicable screening as appropriate for cardiovascular disease, diabetes, osteopenia/osteoporosis, and glaucoma.  As appropriate for age/gender, discussed screening for colorectal cancer, prostate cancer, breast cancer, and cervical cancer. Checklist reviewing preventive services available has been given to the patient.    Reviewed patients plan of care and provided an AVS. The Basic Care Plan for Vida meets the Care Plan requirement. This Care Plan has been established and reviewed with the Patient, and printed in the AVS.    The following health maintenance schedule was reviewed with the patient and provided in printed form in the after visit summary:   Health Maintenance   Topic Date Due     ANNUAL REVIEW OF HM ORDERS  Never done     MEDICARE ANNUAL WELLNESS VISIT  04/06/2023     FALL RISK ASSESSMENT  04/06/2023     MAMMO SCREENING  02/28/2024     LIPID  03/02/2026     ADVANCE CARE PLANNING  03/02/2026     DTAP/TDAP/TD IMMUNIZATION (4 - Td or Tdap) 09/07/2028     COLORECTAL CANCER SCREENING  07/02/2029     DEXA  02/18/2035     HEPATITIS C SCREENING  Completed     PHQ-2 (once per calendar year)  Completed     INFLUENZA VACCINE  Completed     Pneumococcal Vaccine: 65+ Years  Completed     ZOSTER IMMUNIZATION  Completed     COVID-19  Vaccine  Completed     IPV IMMUNIZATION  Aged Out     MENINGITIS IMMUNIZATION  Aged Out     HEPATITIS B IMMUNIZATION  Aged Out            Reviewed and updated as needed this visit by clinical staff   Tobacco  Allergies  Meds              Luiza Gannon MD  [facility] [place]    Offerred video vs face to face?

## 2022-04-07 LAB — DEPRECATED CALCIDIOL+CALCIFEROL SERPL-MC: 33 UG/L (ref 20–75)

## 2022-08-09 ENCOUNTER — TRANSFERRED RECORDS (OUTPATIENT)
Dept: HEALTH INFORMATION MANAGEMENT | Facility: CLINIC | Age: 72
End: 2022-08-09

## 2022-09-21 ENCOUNTER — LAB (OUTPATIENT)
Dept: LAB | Facility: CLINIC | Age: 72
End: 2022-09-21
Payer: COMMERCIAL

## 2022-09-21 DIAGNOSIS — R73.01 IMPAIRED FASTING GLUCOSE: ICD-10-CM

## 2022-09-21 DIAGNOSIS — E78.1 PURE HYPERGLYCERIDEMIA: ICD-10-CM

## 2022-09-21 DIAGNOSIS — E55.9 VITAMIN D DEFICIENCY: ICD-10-CM

## 2022-09-21 LAB
CHOLEST SERPL-MCNC: 215 MG/DL
DEPRECATED CALCIDIOL+CALCIFEROL SERPL-MC: 41 UG/L (ref 20–75)
HBA1C MFR BLD: 5.8 % (ref 0–5.6)
HDLC SERPL-MCNC: 45 MG/DL
LDLC SERPL CALC-MCNC: 133 MG/DL
NONHDLC SERPL-MCNC: 170 MG/DL
TRIGL SERPL-MCNC: 185 MG/DL

## 2022-09-21 PROCEDURE — 36415 COLL VENOUS BLD VENIPUNCTURE: CPT

## 2022-09-21 PROCEDURE — 83036 HEMOGLOBIN GLYCOSYLATED A1C: CPT

## 2022-09-21 PROCEDURE — 80061 LIPID PANEL: CPT

## 2022-09-21 PROCEDURE — 82306 VITAMIN D 25 HYDROXY: CPT

## 2022-09-23 ENCOUNTER — TRANSFERRED RECORDS (OUTPATIENT)
Dept: HEALTH INFORMATION MANAGEMENT | Facility: CLINIC | Age: 72
End: 2022-09-23

## 2022-11-04 ENCOUNTER — VIRTUAL VISIT (OUTPATIENT)
Dept: INTERNAL MEDICINE | Facility: CLINIC | Age: 72
End: 2022-11-04
Payer: COMMERCIAL

## 2022-11-04 DIAGNOSIS — I25.10 CORONARY ARTERY CALCIFICATION SEEN ON CAT SCAN: Primary | ICD-10-CM

## 2022-11-04 DIAGNOSIS — E78.1 PURE HYPERGLYCERIDEMIA: ICD-10-CM

## 2022-11-04 DIAGNOSIS — R73.01 IMPAIRED FASTING GLUCOSE: ICD-10-CM

## 2022-11-04 PROCEDURE — 99213 OFFICE O/P EST LOW 20 MIN: CPT | Mod: 95 | Performed by: INTERNAL MEDICINE

## 2022-11-04 RX ORDER — ROSUVASTATIN CALCIUM 20 MG/1
20 TABLET, COATED ORAL DAILY
Qty: 90 TABLET | Refills: 3 | Status: SHIPPED | OUTPATIENT
Start: 2022-11-04 | End: 2023-01-27

## 2022-11-04 NOTE — PROGRESS NOTES
"Vida is a 72 year old who is being evaluated via a billable video visit.      How would you like to obtain your AVS? MyChart  If the video visit is dropped, the invitation should be resent by: Send to e-mail at: [email or web address]  Will anyone else be joining your video visit? No          Assessment & Plan     Coronary artery calcification seen on CAT scan  Coronary artery calcium scan-124-with clustering around LAD.  Family history significant for premature CAD in father who had first coronary event at age 39.  No other risk factors.  Non-smoker, normotensive, active/etc.    Have had discussion today regarding results of coronary calcium scan.  Recommend LDL cholesterol to 70 or lower.  We will begin moderate dose of high intensity statin and add aspirin.  Encourage regular exercise.  She will need follow-up labs in 6 to 8 weeks.    - aspirin (ASA) 81 MG EC tablet  Dispense: 100 tablet; Refill: 3  - rosuvastatin (CRESTOR) 20 MG tablet  Dispense: 90 tablet; Refill: 3  - Lipid panel reflex to direct LDL Fasting  - Hepatic function panel    Essential Hypertriglyceridemia /coronary calcium  Adding rosuvastatin-20 mg-we will check lipids  - Lipid panel reflex to direct LDL Fasting  - Hepatic function panel    Impaired fasting glucose  We will recheck hemoglobin A1c.  Of note, she has lost 15 pounds.  - Hemoglobin A1c      0956}     BMI:   Estimated body mass index is 29.88 kg/m  as calculated from the following:    Height as of 4/6/22: 1.702 m (5' 7\").    Weight as of 4/6/22: 86.5 kg (190 lb 12.8 oz).         Luiza Gannon MD  [facility]    Subjective 2  Vida is a 72 year old, presenting for the following health issues:  Follow Up (Lab and Ca+ scan )      Vida is here today on a video visit for discussion of coronary calcium scan.  This is in follow-up to her wellness visit in September.  Of note, she has had longstanding history of hypertriglyceridemia.  It has been modest in nature.  " Her most recent cholesterol was 215.  Her LDL cholesterol was 133.  Of note, there is a family history of premature coronary artery disease in her father who had a coronary event at age 39.  This was post work.  Not many details are known regarding his heart disease.  Her brother who is 1 to 2 years younger has had 2 coronary stents and premature CAD.    She denies any chest pain.  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    Additionally, she reports that she and her  have had their home assessed for radon.  The levels in the lower basement have been high but this is been mitigated.  She is wondering if there is anything further to do.  She did not get up plain CT report from her coronary calcium scan.    Additionally, she has had a modest elevation of glycohemoglobin for the past 7 to 8 years.  It has not appreciably increased.  She is lost 15 pounds since our last encounter.    History of Present Illness       Reason for visit:  Had calcium scan and repeat labs.  Looking at what are recommendations based on these results.  A copy of the calcium scan was to be sent to Dr Pelletier.  If you did not receive it, I have a copy as well.    She eats 4 or more servings of fruits and vegetables daily.She consumes 0 sweetened beverage(s) daily.She exercises with enough effort to increase her heart rate 30 to 60 minutes per day.  She exercises with enough effort to increase her heart rate 5 days per week.   She is taking medications regularly.         Objective    Vitals - Patient Reported  Weight (Patient Reported): 79.4 kg (175 lb)    She appears healthy and well and in no acute distress  Vitals:  No vitals were obtained today due to virtual visit.    Physical Exam     Coronary calcium  CT scan report reviewed by me            Video-Visit Details    Video Start Time: 2:05 PM    Type of service:  Video Visit    Video End Time:2:25 PM    Originating Location (pt. Location): Home    Distant  Location (provider location):  Off-site    Platform used for Video Visit: Brianda

## 2022-11-15 ENCOUNTER — HOSPITAL ENCOUNTER (OUTPATIENT)
Dept: BONE DENSITY | Facility: HOSPITAL | Age: 72
Discharge: HOME | End: 2022-11-15
Attending: INTERNAL MEDICINE | Admitting: INTERNAL MEDICINE
Payer: COMMERCIAL

## 2022-11-15 PROCEDURE — 77080 DXA BONE DENSITY AXIAL: CPT

## 2022-11-27 ENCOUNTER — MYC MEDICAL ADVICE (OUTPATIENT)
Dept: INTERNAL MEDICINE | Facility: CLINIC | Age: 72
End: 2022-11-27

## 2022-11-29 ENCOUNTER — OFFICE VISIT (OUTPATIENT)
Dept: FAMILY MEDICINE | Facility: CLINIC | Age: 72
End: 2022-11-29
Payer: COMMERCIAL

## 2022-11-29 VITALS
HEART RATE: 72 BPM | SYSTOLIC BLOOD PRESSURE: 108 MMHG | OXYGEN SATURATION: 96 % | DIASTOLIC BLOOD PRESSURE: 63 MMHG | TEMPERATURE: 97.5 F

## 2022-11-29 DIAGNOSIS — L03.213 PRESEPTAL CELLULITIS OF RIGHT UPPER EYELID: Primary | ICD-10-CM

## 2022-11-29 DIAGNOSIS — J06.9 VIRAL UPPER RESPIRATORY TRACT INFECTION WITH COUGH: ICD-10-CM

## 2022-11-29 LAB
FLUAV AG SPEC QL IA: NEGATIVE
FLUBV AG SPEC QL IA: NEGATIVE

## 2022-11-29 PROCEDURE — 87804 INFLUENZA ASSAY W/OPTIC: CPT | Performed by: PHYSICIAN ASSISTANT

## 2022-11-29 PROCEDURE — 99213 OFFICE O/P EST LOW 20 MIN: CPT | Performed by: PHYSICIAN ASSISTANT

## 2022-11-29 NOTE — PATIENT INSTRUCTIONS
Patient was educated on the natural course of condition.  Take medications as prescribed for preseptal cellulitis. Side effects may include stomachache or diarrhea. Conservative measures discussed including increased fluids, nasal saline irrigation (neti pot), warm steamy shower, expectorants (Mucinex), decongestants (Pseudofed), and analgesics (Tylenol). See your primary care provider if symptoms worsen or do not improve in 7 days. Seek emergency care if you develop fever over 103 or shortness of breath.

## 2022-11-29 NOTE — PROGRESS NOTES
URGENT CARE VISIT:    SUBJECTIVE:   Vida Najera is a 72 year old female presenting with a chief complaint of stuffy nose, cough - productive, headache and right eye discharge.  Onset was 5 day(s) ago and yesterday for eye symptom.  She denies the following symptoms: shortness of breath, vomiting and diarrhea  Course of illness is worsening.    Treatment measures tried include Mucinex with no relief of symptoms.  Predisposing factors include None.    PMH: History reviewed. No pertinent past medical history.  Allergies: Patient has no known allergies.   Medications:   Current Outpatient Medications   Medication Sig Dispense Refill     acyclovir (ZOVIRAX) 400 MG tablet [ACYCLOVIR (ZOVIRAX) 400 MG TABLET] Take 400 mg by mouth 2 (two) times a day.       amoxicillin-clavulanate (AUGMENTIN) 875-125 MG tablet Take 1 tablet by mouth 2 times daily for 7 days 14 tablet 0     cholecalciferol, vitamin D3, 50 mcg (2,000 unit) capsule [CHOLECALCIFEROL, VITAMIN D3, 50 MCG (2,000 UNIT) CAPSULE] 1 capsule daily.       PROPYLENE GLYCOL/ (SYSTANE ULTRA OPHT) [PROPYLENE GLYCOL/ (SYSTANE ULTRA OPHT)] Apply to eye 2 (two) times a day.       rosuvastatin (CRESTOR) 20 MG tablet Take 1 tablet (20 mg) by mouth daily 90 tablet 3     timoloL maleate (TIMOPTIC) 0.5 % ophthalmic solution [TIMOLOL MALEATE (TIMOPTIC) 0.5 % OPHTHALMIC SOLUTION] Administer 1 drop into the left eye daily.        vit C,Q-Th-mzzxu-lutein-zeaxan (PRESERVISION AREDS-2) 250-90-40-1 mg cap [VIT C,I-AV-ZXHBD-LUTEIN-ZEAXAN (PRESERVISION AREDS-2) 250-90-40-1 MG CAP]        aspirin (ASA) 81 MG EC tablet Take 1 tablet (81 mg) by mouth daily (Patient not taking: Reported on 11/29/2022) 100 tablet 3     Social History:   Social History     Tobacco Use     Smoking status: Never     Smokeless tobacco: Never   Substance Use Topics     Alcohol use: Not on file       ROS:  General: negative  Skin: negative  Eyes: redness  Ears/Nose/Throat: nasal  congestion  Respiratory: Cough  Cardiovascular: negative  Gastrointestinal: negative  Genitourinary: negative  Musculoskeletal: negative  Neurologic: negative      OBJECTIVE:  /63 (BP Location: Right arm, Patient Position: Sitting, Cuff Size: Adult Regular)   Pulse 72   Temp 97.5  F (36.4  C) (Tympanic)   SpO2 96%   GENERAL APPEARANCE: healthy, alert and no distress  EYES: EOMI,  PERRL, right conjunctival injection with mattering. There is erythema over right upper and lower eyelid. There is faint extension into upper cheek  HENT: ear canals and TM's normal.  Nose and mouth without ulcers, erythema or lesions  NECK: supple, nontender, no lymphadenopathy  RESP: lungs clear to auscultation - no rales, rhonchi or wheezes  CV: regular rates and rhythm, normal S1 S2, no murmur noted  SKIN: no suspicious lesions or rashes    Labs:    Results for orders placed or performed in visit on 11/29/22   Influenza A & B Antigen - Clinic Collect     Status: Normal    Specimen: Nose; Swab   Result Value Ref Range    Influenza A antigen Negative Negative    Influenza B antigen Negative Negative    Narrative    Test results must be correlated with clinical data. If necessary, results should be confirmed by a molecular assay or viral culture.       ASSESSMENT:    ICD-10-CM    1. Preseptal cellulitis of right upper eyelid  L03.213 Influenza A & B Antigen - Clinic Collect     amoxicillin-clavulanate (AUGMENTIN) 875-125 MG tablet      2. Viral upper respiratory tract infection with cough  J06.9           PLAN:  Patient Instructions   Patient was educated on the natural course of condition.  Take medications as prescribed for preseptal cellulitis. Side effects may include stomachache or diarrhea. Conservative measures discussed including increased fluids, nasal saline irrigation (neti pot), warm steamy shower, expectorants (Mucinex), decongestants (Pseudofed), and analgesics (Tylenol). See your primary care provider if symptoms  worsen or do not improve in 7 days. Seek emergency care if you develop fever over 103, severe eye redness/swelling or shortness of breath.    Patient verbalized understanding and is agreeable to plan. The patient was discharged ambulatory and in stable condition.    Naye Love PA-C ....................  11/29/2022   11:49 AM

## 2022-12-07 NOTE — TELEPHONE ENCOUNTER
FUTURE VISIT INFORMATION      FUTURE VISIT INFORMATION:    Date: 1/23/23    Time: 11:20am    Location: [facility]  REFERRAL INFORMATION:    Referring provider:      Referring providers clinic:      Reason for visit/diagnosis  per pt / ear buzzing, hearing loss    RECORDS REQUESTED FROM:       No records

## 2022-12-16 ENCOUNTER — TRANSFERRED RECORDS (OUTPATIENT)
Dept: HEALTH INFORMATION MANAGEMENT | Facility: CLINIC | Age: 72
End: 2022-12-16

## 2022-12-24 ENCOUNTER — TRANSFERRED RECORDS (OUTPATIENT)
Dept: HEALTH INFORMATION MANAGEMENT | Facility: CLINIC | Age: 72
End: 2022-12-24

## 2022-12-28 DIAGNOSIS — Z01.10 ENCOUNTER FOR HEARING TEST: Primary | ICD-10-CM

## 2022-12-30 ENCOUNTER — TRANSFERRED RECORDS (OUTPATIENT)
Dept: HEALTH INFORMATION MANAGEMENT | Facility: CLINIC | Age: 72
End: 2022-12-30

## 2023-01-02 ENCOUNTER — LAB (OUTPATIENT)
Dept: LAB | Facility: CLINIC | Age: 73
End: 2023-01-02
Payer: COMMERCIAL

## 2023-01-02 DIAGNOSIS — R73.01 IMPAIRED FASTING GLUCOSE: ICD-10-CM

## 2023-01-02 DIAGNOSIS — E78.1 PURE HYPERGLYCERIDEMIA: ICD-10-CM

## 2023-01-02 DIAGNOSIS — I25.10 CORONARY ARTERY CALCIFICATION SEEN ON CAT SCAN: ICD-10-CM

## 2023-01-02 LAB
ALBUMIN SERPL BCG-MCNC: 4.4 G/DL (ref 3.5–5.2)
ALP SERPL-CCNC: 97 U/L (ref 35–104)
ALT SERPL W P-5'-P-CCNC: 64 U/L (ref 10–35)
AST SERPL W P-5'-P-CCNC: 80 U/L (ref 10–35)
BILIRUB DIRECT SERPL-MCNC: <0.2 MG/DL (ref 0–0.3)
BILIRUB SERPL-MCNC: 0.4 MG/DL
CHOLEST SERPL-MCNC: 225 MG/DL
HBA1C MFR BLD: 6.1 % (ref 0–5.6)
HDLC SERPL-MCNC: 64 MG/DL
LDLC SERPL CALC-MCNC: 124 MG/DL
NONHDLC SERPL-MCNC: 161 MG/DL
PROT SERPL-MCNC: 7.4 G/DL (ref 6.4–8.3)
TRIGL SERPL-MCNC: 183 MG/DL

## 2023-01-02 PROCEDURE — 80076 HEPATIC FUNCTION PANEL: CPT

## 2023-01-02 PROCEDURE — 83036 HEMOGLOBIN GLYCOSYLATED A1C: CPT

## 2023-01-02 PROCEDURE — 80061 LIPID PANEL: CPT

## 2023-01-02 PROCEDURE — 36415 COLL VENOUS BLD VENIPUNCTURE: CPT

## 2023-01-03 PROBLEM — R73.03 PREDIABETES: Status: ACTIVE | Noted: 2023-01-03

## 2023-01-05 ENCOUNTER — VIRTUAL VISIT (OUTPATIENT)
Dept: FAMILY MEDICINE | Facility: CLINIC | Age: 73
End: 2023-01-05
Payer: COMMERCIAL

## 2023-01-05 DIAGNOSIS — E78.1 ESSENTIAL HYPERTRIGLYCERIDEMIA: ICD-10-CM

## 2023-01-05 DIAGNOSIS — J32.9 CHRONIC SINUSITIS, UNSPECIFIED LOCATION: ICD-10-CM

## 2023-01-05 DIAGNOSIS — R73.03 PREDIABETES: Primary | ICD-10-CM

## 2023-01-05 DIAGNOSIS — E78.00 HYPERCHOLESTEREMIA: ICD-10-CM

## 2023-01-05 PROCEDURE — 99214 OFFICE O/P EST MOD 30 MIN: CPT | Mod: 95 | Performed by: FAMILY MEDICINE

## 2023-01-05 RX ORDER — DOXYCYCLINE 100 MG/1
TABLET ORAL
COMMUNITY
Start: 2023-01-03 | End: 2023-01-27

## 2023-01-05 NOTE — PROGRESS NOTES
"Vida is a 72 year old who is being evaluated via a billable video visit.      How would you like to obtain your AVS? MyChart  If the video visit is dropped, the invitation should be resent by: Text to cell phone: [phone]  Will anyone else be joining your video visit? No          Assessment & Plan     Prediabetes  Continue with diet and exercise and will continue to monitor.  Recommend labs in 3 months    Hypercholesteremia  Due to elevated LFTs and myalgia, patient would like to trial stopping the crestor and then repeat labs with new PCP in 1 month.    Essential hypertriglyceridemia  Unchanged and plan for repeat labs    Chronic sinusitis, unspecified location  Seen by ENT and will finish doxycycline               BMI:   Estimated body mass index is 29.88 kg/m  as calculated from the following:    Height as of 4/6/22: 1.702 m (5' 7\").    Weight as of 4/6/22: 86.5 kg (190 lb 12.8 oz).           No follow-ups on file.    Tova Alonso MD  [facility]   Vida is a 72 year old, presenting for the following health issues:  Results      History of Present Illness       Reason for visit:  Follow up from 1.2.23 labs, medication effects, next steps    She eats 4 or more servings of fruits and vegetables daily.She consumes 0 sweetened beverage(s) daily.She exercises with enough effort to increase her heart rate 30 to 60 minutes per day.  She exercises with enough effort to increase her heart rate 5 days per week.   She is taking medications regularly.     Felt like having some side effects from the rouvastatin.  Felt like more awake.  Didn't like the taste and started taking with food.  Some elevated LFTs.  Still prediabetes.    The 10-year ASCVD risk score (Cristofer FAITH, et al., 2019) is: 7.7%    Values used to calculate the score:      Age: 72 years      Sex: Female      Is Non- : No      Diabetic: No      Tobacco smoker: No      Systolic Blood Pressure: 108 " mmHg      Is BP treated: No      HDL Cholesterol: 64 mg/dL      Total Cholesterol: 225 mg/dL      Hx of sinus infection - feels like hearing is muffled.   Seen by [facility] and ordered MRI which was normal.  Some hearing loss seen.  Doxycycline for residual sinusitis.    Trying to monitor weight and down 12 lbs.  Exercise daily and trying to watch diet.  No increased urinary symptoms or thirst.          Review of Systems   Constitutional, HEENT, cardiovascular, pulmonary, gi and gu systems are negative, except as otherwise noted.      Objective           Vitals:  No vitals were obtained today due to virtual visit.    Physical Exam   GENERAL: Healthy, alert and no distress  EYES: Eyes grossly normal to inspection.  No discharge or erythema, or obvious scleral/conjunctival abnormalities.  RESP: No audible wheeze, cough, or visible cyanosis.  No visible retractions or increased work of breathing.    SKIN: Visible skin clear. No significant rash, abnormal pigmentation or lesions.  NEURO: Cranial nerves grossly intact.  Mentation and speech appropriate for age.  PSYCH: Mentation appears normal, affect normal/bright, judgement and insight intact, normal speech and appearance well-groomed.                Video-Visit Details    Type of service:  Video Visit   Video Start Time: 12:20  Video End Time:12:41 PM    Originating Location (pt. Location): Home    Distant Location (provider location):  Off-site  Platform used for Video Visit: Zoom

## 2023-01-23 ENCOUNTER — PRE VISIT (OUTPATIENT)
Dept: OTOLARYNGOLOGY | Facility: CLINIC | Age: 73
End: 2023-01-23

## 2023-01-27 ENCOUNTER — OFFICE VISIT (OUTPATIENT)
Dept: INTERNAL MEDICINE | Facility: CLINIC | Age: 73
End: 2023-01-27
Payer: COMMERCIAL

## 2023-01-27 VITALS
SYSTOLIC BLOOD PRESSURE: 118 MMHG | OXYGEN SATURATION: 97 % | WEIGHT: 185.8 LBS | HEART RATE: 71 BPM | HEIGHT: 67 IN | DIASTOLIC BLOOD PRESSURE: 72 MMHG | BODY MASS INDEX: 29.16 KG/M2

## 2023-01-27 DIAGNOSIS — M89.9 DISORDER OF BONE AND CARTILAGE: ICD-10-CM

## 2023-01-27 DIAGNOSIS — E78.2 MIXED HYPERLIPIDEMIA: Primary | ICD-10-CM

## 2023-01-27 DIAGNOSIS — J32.9 CHRONIC RHINOSINUSITIS: ICD-10-CM

## 2023-01-27 DIAGNOSIS — M94.9 DISORDER OF BONE AND CARTILAGE: ICD-10-CM

## 2023-01-27 DIAGNOSIS — R73.03 PREDIABETES: ICD-10-CM

## 2023-01-27 DIAGNOSIS — H90.6 MIXED HEARING LOSS, BILATERAL: ICD-10-CM

## 2023-01-27 PROBLEM — J31.0 CHRONIC RHINITIS: Status: ACTIVE | Noted: 2023-01-27

## 2023-01-27 PROCEDURE — 99214 OFFICE O/P EST MOD 30 MIN: CPT | Performed by: INTERNAL MEDICINE

## 2023-01-27 RX ORDER — FLUTICASONE PROPIONATE 50 MCG
1 SPRAY, SUSPENSION (ML) NASAL DAILY
Qty: 9.9 ML | Refills: 1 | Status: SHIPPED | OUTPATIENT
Start: 2023-01-27 | End: 2023-02-28

## 2023-01-27 ASSESSMENT — PAIN SCALES - GENERAL: PAINLEVEL: NO PAIN (0)

## 2023-01-27 NOTE — TELEPHONE ENCOUNTER
FUTURE VISIT INFORMATION      FUTURE VISIT INFORMATION:    Date: 3/9/23    Time: 1:50pm    Location: [facility]  REFERRAL INFORMATION:    Referring provider:  Cassandra Valerio MD    Referring providers clinic:  [facility]     Reason for visit/diagnosis  Mixed hearing loss, bilateral [H90.6], ref'd by Cassandra Valerio MD, rec's in EPIC, pt made appt, [facility] location    RECORDS REQUESTED FROM:       Clinic name Comments Records Status Imaging Status   [facility]  1/27/23- note with  Cassandra Valerio MD Epic

## 2023-01-27 NOTE — ASSESSMENT & PLAN NOTE
Last DEXA 11/2022 showed osteopenia. Is taking vitamin D 2000 international units.   - Continue vitamin D supplementation  - Encouraged weight bearing/strength exercises  - Plan to repeat DEXA 11/2024

## 2023-01-27 NOTE — ASSESSMENT & PLAN NOTE
Patient was seen by [facility] 12/16/22.  Audiogram showed mild mixed hearing loss in L ear and moderate mixed hearing loss in the right ear. Thought to be 2/2 recent infection but brain MRI also ordered to evaluate given asymmetry, which was normal. Still having issues with hearing loss without much improvement even though initial infection was almost two months ago. Exam of EACs and TMs normal today.   - ENT referral through [facility] placed for second opinion

## 2023-01-27 NOTE — PATIENT INSTRUCTIONS
For Nose and Sinuses:   - Flonase nasal spray in both nostrils once a day for a week or two, then can try off again   - Saline rinse/spray (Jaret med) in both nostrils at least once day, can do twice day     For Ears  - I will put in another ENT referral, they will call you to schedule  - Okay to try an antihistamine like zyrtec, allegra or claritin   - Along with above nasal sprays     Lab appointment next week in AM for fasting labs.  You should call to schedule annual wellness visit after April 6th.

## 2023-01-27 NOTE — ASSESSMENT & PLAN NOTE
Coronary calcium score 124 (9/2022), lipids 9/21/22 Tchol 215, HDL 45, ,  . She was then started on crestor 20 mg daily. Did have muscle aches and repeat labs 1/2/23: Tchol 225, , HDL 64,  with ALT 64/AST 80. Stopped crestor.   - Repeat lipids next week   - If liver enzymes normalized and lipids still elevated, likely start low dose atorvastatin   - Continue baby aspirin

## 2023-01-27 NOTE — PROGRESS NOTES
Assessment & Plan   Problem List Items Addressed This Visit        Nervous and Auditory    Mixed hearing loss, bilateral     Patient was seen by [facility] 12/16/22.  Audiogram showed mild mixed hearing loss in L ear and moderate mixed hearing loss in the right ear. Thought to be 2/2 recent infection but brain MRI also ordered to evaluate given asymmetry, which was normal. Still having issues with hearing loss without much improvement even though initial infection was almost two months ago. Exam of EACs and TMs normal today.   - ENT referral through [facility] for second opinion         Relevant Orders    Adult ENT  Referral       Respiratory    Chronic rhinosinusitis     Patient had likely sinusitis and preseptal cellulitis 11/2022, treated with Augmentin. Then brain MRI 12/30/22 showed left frontal and right maxillary marked paranasal sinus disease. Fullness in sinuses is improved but she still has significant nasal congestion and on exam has erythematous nasal mucosa with boggy turbinates bilaterally.   - Start flonase daily and saline rinses BID  - Can trial antihistamine as well   - Will be seeing ENT for hearing loss as well          Relevant Medications    fluticasone (FLONASE) 50 MCG/ACT nasal spray       Endocrine    Essential hypertriglyceridemia - Primary     Coronary calcium score 124 (9/2022), lipids 9/21/22 Tchol 215, HDL 45, ,  . She was then started on crestor 20 mg daily. Did have muscle aches and repeat labs 1/2/23: Tchol 225, , HDL 64,  with ALT 64/AST 80. Stopped crestor.   - Repeat lipids next week   - If liver enzymes normalized and lipids still elevated, likely start low dose atorvastatin   - Continue baby aspirin          Prediabetes     A1c 6.1 (1/2023). Encouraged healthy lifestyle changes. Repeat at next follow up.             Musculoskeletal and Integumentary    Osteopenia     Last DEXA 11/2022 showed osteopenia. Is taking vitamin D 2000  "international units.   - Continue vitamin D supplementation  - Encouraged weight bearing/strength exercises  - Plan to repeat DEXA 11/2024           BMI:   Estimated body mass index is 29.1 kg/m  as calculated from the following:    Height as of this encounter: 1.702 m (5' 7\").    Weight as of this encounter: 84.3 kg (185 lb 12.8 oz).   Weight management plan: Discussed healthy diet and exercise guidelines    Return in about 3 months (around 4/27/2023).    Cassandra Valerio MD  [facility]O Mcpherson is a 73 year old, presenting for the following health issues:  Establish Care and Ear Problem (Hearing has changed after sinusitis back in Nov. Has seen ENT. Records have been copies. )  History of Present Illness   Reason for visit:  Labs after Rosuvastatin, Past sinus infection, acute hearing change, tinnutis    Sinusitis / Hearing Loss: Was seen in [facility] 11/29/22 for preseptal cellulitis. Treated with augmentin. Shortly after that hearing loss started with muffled feeling. Saw ENT 12/16/22, brings in records of this visit today which were reviewed. Their exam showed normal-appearing EACs and TMs, septal deviation in her nose, and slight irritation of her vocal cords.  She had audiology exam that showed mild mixed hearing loss in L ear and moderate mixed hearing loss in the right ear.  They discussed healthy irritation of the vocal cords as likely cause of hoarseness.  The audiogram showing mixed hearing loss a little bit atypical and they thought likely due to recent infection, however since that showed an asymmetry between the ears they recommended getting a brain MRI to assess this.  She had brain MRI done on 12/30/2022, this showed normal MR appearance of the IACs and brain, along with left frontal and right maxillary marked paranasal sinus disease. They did another 10 day course of doxycycline because of sinusitis on the MRI. Nasal congestion improved some with the " "doxycycline. Ears still feel full.     Pre-DM: A1c 6.1 (23), working on healthy lifestyle    HLD/CAD: Coronary calcium score 124 (2022), lipids 22 Tchol 215, HDL 45, ,  - started on crestor 20 mg daily. Did have muscle aches. Repeat labs 23: Tchol 225, , HDL 64,  with ALT 64/AST 80. Stopped crestor. Still taking baby aspirin.     Osteopenia: Last DEXA 2022 showed osteopenia. Is taking vitamin D 2000 international units    Glaucoma: seeing ophtho every 6 months, next visit is next week. Timolol 0.5% drops. Vision loss in L eye. Also taking an eye vitamin.     Four grandchildren (3 girls and 1 boy, 8 6 3.5 and ) here and 2 in [place].     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 Andi from [place] [employer]     She eats 4 or more servings of fruits and vegetables daily.She consumes 0 sweetened beverage(s) daily.She exercises with enough effort to increase her heart rate 30 to 60 minutes per day.  She exercises with enough effort to increase her heart rate 5 days per week.   She is taking medications regularly.    Review of Systems   Constitutional, HEENT, cardiovascular, pulmonary, gi and gu systems are negative, except as otherwise noted.      Objective    /72 (BP Location: Right arm, Patient Position: Sitting, Cuff Size: Adult Regular)   Pulse 71   Ht 1.702 m (5' 7\")   Wt 84.3 kg (185 lb 12.8 oz)   SpO2 97%   BMI 29.10 kg/m    Body mass index is 29.1 kg/m .  Physical Exam   GENERAL: healthy, alert and no distress  EYES: Eyes grossly normal to inspection and conjunctivae and sclerae normal  HENT: B/l ear canals and TM's normal, b/l nares erythematous with boggy turbinates, and mouth without ulcers or lesions  NECK: no adenopathy, no asymmetry, masses, or scars and thyroid normal to palpation  RESP: lungs clear to auscultation - no rales, rhonchi or wheezes  CV: regular rate and rhythm, normal S1 S2, no S3 " or S4, no murmur, click or rub, no peripheral edema and peripheral pulses strong  ABDOMEN: soft, nontender, no hepatosplenomegaly, no masses and bowel sounds normal  MS: no gross musculoskeletal defects noted, no edema  SKIN: no suspicious lesions or rashes  NEURO: Normal strength and tone, mentation intact and speech normal  PSYCH: mentation appears normal, affect normal/bright    Lab on 01/02/2023   Component Date Value Ref Range Status     Cholesterol 01/02/2023 225 (H)  <200 mg/dL Final     Triglycerides 01/02/2023 183 (H)  <150 mg/dL Final     Direct Measure HDL 01/02/2023 64  >=50 mg/dL Final     LDL Cholesterol Calculated 01/02/2023 124 (H)  <=100 mg/dL Final     Non HDL Cholesterol 01/02/2023 161 (H)  <130 mg/dL Final     Hemoglobin A1C 01/02/2023 6.1 (H)  0.0 - 5.6 % Final    Normal <5.7%   Prediabetes 5.7-6.4%    Diabetes 6.5% or higher     Note: Adopted from ADA consensus guidelines.     Protein Total 01/02/2023 7.4  6.4 - 8.3 g/dL Final     Albumin 01/02/2023 4.4  3.5 - 5.2 g/dL Final     Bilirubin Total 01/02/2023 0.4  <=1.2 mg/dL Final     Alkaline Phosphatase 01/02/2023 97  35 - 104 U/L Final     AST 01/02/2023 80 (H)  10 - 35 U/L Final     ALT 01/02/2023 64 (H)  10 - 35 U/L Final     Bilirubin Direct 01/02/2023 <0.20  0.00 - 0.30 mg/dL Final

## 2023-01-27 NOTE — ASSESSMENT & PLAN NOTE
Patient had likely sinusitis and preseptal cellulitis 11/2022, treated with Augmentin. Then brain MRI 12/30/22 showed left frontal and right maxillary marked paranasal sinus disease. Fullness in sinuses is improved but she still has significant nasal congestion and on exam has erythematous nasal mucosa with boggy turbinates bilaterally.   - Start flonase daily and saline rinses BID  - Can trial antihistamine as well   - Will be seeing ENT for hearing loss as well

## 2023-02-06 ENCOUNTER — LAB (OUTPATIENT)
Dept: LAB | Facility: CLINIC | Age: 73
End: 2023-02-06
Payer: COMMERCIAL

## 2023-02-06 DIAGNOSIS — E78.2 MIXED HYPERLIPIDEMIA: ICD-10-CM

## 2023-02-06 LAB
ALBUMIN SERPL BCG-MCNC: 4.5 G/DL (ref 3.5–5.2)
ALP SERPL-CCNC: 85 U/L (ref 35–104)
ALT SERPL W P-5'-P-CCNC: 47 U/L (ref 10–35)
AST SERPL W P-5'-P-CCNC: 69 U/L (ref 10–35)
BILIRUB DIRECT SERPL-MCNC: <0.2 MG/DL (ref 0–0.3)
BILIRUB SERPL-MCNC: 0.3 MG/DL
CHOLEST SERPL-MCNC: 387 MG/DL
HDLC SERPL-MCNC: 67 MG/DL
LDLC SERPL CALC-MCNC: 274 MG/DL
NONHDLC SERPL-MCNC: 320 MG/DL
PROT SERPL-MCNC: 7.3 G/DL (ref 6.4–8.3)
TRIGL SERPL-MCNC: 228 MG/DL

## 2023-02-06 PROCEDURE — 36415 COLL VENOUS BLD VENIPUNCTURE: CPT

## 2023-02-06 PROCEDURE — 80061 LIPID PANEL: CPT

## 2023-02-06 PROCEDURE — 80076 HEPATIC FUNCTION PANEL: CPT

## 2023-02-07 ENCOUNTER — MYC MEDICAL ADVICE (OUTPATIENT)
Dept: INTERNAL MEDICINE | Facility: CLINIC | Age: 73
End: 2023-02-07
Payer: COMMERCIAL

## 2023-02-16 ENCOUNTER — LAB (OUTPATIENT)
Dept: LAB | Facility: CLINIC | Age: 73
End: 2023-02-16
Payer: COMMERCIAL

## 2023-02-16 ENCOUNTER — MYC MEDICAL ADVICE (OUTPATIENT)
Dept: INTERNAL MEDICINE | Facility: CLINIC | Age: 73
End: 2023-02-16

## 2023-02-16 DIAGNOSIS — R79.89 ELEVATED LFTS: ICD-10-CM

## 2023-02-16 DIAGNOSIS — E78.2 MIXED HYPERLIPIDEMIA: ICD-10-CM

## 2023-02-16 DIAGNOSIS — R53.83 OTHER FATIGUE: ICD-10-CM

## 2023-02-16 DIAGNOSIS — E78.2 MIXED HYPERLIPIDEMIA: Primary | ICD-10-CM

## 2023-02-16 DIAGNOSIS — E03.9 ACQUIRED HYPOTHYROIDISM: ICD-10-CM

## 2023-02-16 LAB
ALBUMIN SERPL BCG-MCNC: 4.6 G/DL (ref 3.5–5.2)
ALP SERPL-CCNC: 81 U/L (ref 35–104)
ALT SERPL W P-5'-P-CCNC: 45 U/L (ref 10–35)
AST SERPL W P-5'-P-CCNC: 63 U/L (ref 10–35)
BILIRUB DIRECT SERPL-MCNC: <0.2 MG/DL (ref 0–0.3)
BILIRUB SERPL-MCNC: 0.4 MG/DL
CHOLEST SERPL-MCNC: 403 MG/DL
HDLC SERPL-MCNC: 67 MG/DL
LDLC SERPL CALC-MCNC: 287 MG/DL
NONHDLC SERPL-MCNC: 336 MG/DL
PROT SERPL-MCNC: 7.4 G/DL (ref 6.4–8.3)
TRIGL SERPL-MCNC: 243 MG/DL

## 2023-02-16 PROCEDURE — 36415 COLL VENOUS BLD VENIPUNCTURE: CPT

## 2023-02-16 PROCEDURE — 80061 LIPID PANEL: CPT

## 2023-02-16 PROCEDURE — 80076 HEPATIC FUNCTION PANEL: CPT

## 2023-02-21 ENCOUNTER — LAB (OUTPATIENT)
Dept: LAB | Facility: CLINIC | Age: 73
End: 2023-02-21
Payer: COMMERCIAL

## 2023-02-21 DIAGNOSIS — R53.83 OTHER FATIGUE: ICD-10-CM

## 2023-02-21 LAB
T4 FREE SERPL-MCNC: <0.1 NG/DL (ref 0.9–1.7)
TSH SERPL DL<=0.005 MIU/L-ACNC: 128 UIU/ML (ref 0.3–4.2)

## 2023-02-21 PROCEDURE — 84439 ASSAY OF FREE THYROXINE: CPT

## 2023-02-21 PROCEDURE — 36415 COLL VENOUS BLD VENIPUNCTURE: CPT

## 2023-02-21 PROCEDURE — 84443 ASSAY THYROID STIM HORMONE: CPT

## 2023-02-21 RX ORDER — LEVOTHYROXINE SODIUM 50 UG/1
50 TABLET ORAL DAILY
Qty: 60 TABLET | Refills: 1 | Status: SHIPPED | OUTPATIENT
Start: 2023-02-21 | End: 2023-04-03

## 2023-02-22 NOTE — TELEPHONE ENCOUNTER
Please have her come in for an in person office visit.  Okay to use any blocked slots that are not my personal breaks to get her in more quickly.

## 2023-02-27 ENCOUNTER — TELEPHONE (OUTPATIENT)
Dept: NURSING | Facility: CLINIC | Age: 73
End: 2023-02-27
Payer: COMMERCIAL

## 2023-02-27 NOTE — TELEPHONE ENCOUNTER
Reason for Call:  Appointment Request    Patient requesting this type of appt:  Follow up     Requested provider: Cassandra Valerio    Reason patient unable to be scheduled: Not within requested timeframe    When does patient want to be seen/preferred time: 3-7 days    Comments: follow up     Could we send this information to you in [facility] or would you prefer to receive a phone call?:   Patient would prefer a phone call   Okay to leave a detailed message?: Yes at Cell number on file:    Telephone Information:   Mobile [phone]       Call taken on 2/27/2023 at 9:27 AM by Irene Machado

## 2023-02-28 ENCOUNTER — OFFICE VISIT (OUTPATIENT)
Dept: INTERNAL MEDICINE | Facility: CLINIC | Age: 73
End: 2023-02-28
Payer: COMMERCIAL

## 2023-02-28 VITALS
HEART RATE: 73 BPM | OXYGEN SATURATION: 97 % | HEIGHT: 67 IN | BODY MASS INDEX: 29.03 KG/M2 | WEIGHT: 185 LBS | DIASTOLIC BLOOD PRESSURE: 71 MMHG | SYSTOLIC BLOOD PRESSURE: 119 MMHG | TEMPERATURE: 98 F

## 2023-02-28 DIAGNOSIS — E03.9 ACQUIRED HYPOTHYROIDISM: ICD-10-CM

## 2023-02-28 DIAGNOSIS — E78.2 MIXED HYPERLIPIDEMIA: Primary | ICD-10-CM

## 2023-02-28 DIAGNOSIS — H90.6 MIXED HEARING LOSS, BILATERAL: ICD-10-CM

## 2023-02-28 DIAGNOSIS — R79.89 ELEVATED LFTS: ICD-10-CM

## 2023-02-28 PROCEDURE — 99214 OFFICE O/P EST MOD 30 MIN: CPT | Performed by: INTERNAL MEDICINE

## 2023-02-28 ASSESSMENT — PAIN SCALES - GENERAL: PAINLEVEL: NO PAIN (0)

## 2023-02-28 NOTE — ASSESSMENT & PLAN NOTE
Patient had new onset significant hypothyroidism found on labs 2/21/2023 (, FT4 <0.10). Likely Hashimotos.  She was started on Synthroid 50 mcg daily. Her significant hyperlipidemia is likely a result of hypothyroidism, question whether myalgias that were thought due to statin were instead due to hypothyroidism.  We will plan to recheck her thyroid studies in 6 weeks.  Likely will have to go up on Synthroid but wanted to start with low-dose given her age.  - Check TSH, FT4 and TPO in 5 weeks

## 2023-02-28 NOTE — ASSESSMENT & PLAN NOTE
New onset mild AST and ALT elevation in January of this year.  Initially thought to be secondary to starting her statin, however have remained very mildly elevated since then even though statin has been stopped.  Question whether this may be related to her hypothyroidism.    - Abdominal US scheduled for 3/2/23  - We will plan to repeat her LFTs in 5 weeks.

## 2023-02-28 NOTE — ASSESSMENT & PLAN NOTE
Since her last visit she did see [facility] again.  No big changes to the plan.  Patient says her hearing is fairly stable today and still not quite back to normal.  She will see [facility] in March.

## 2023-02-28 NOTE — PROGRESS NOTES
Assessment & Plan   Problem List Items Addressed This Visit        Nervous and Auditory    Mixed hearing loss, bilateral     Since her last visit she did see [facility] again.  No big changes to the plan.  Patient says her hearing is fairly stable today and still not quite back to normal.  She will see [facility] in March.            Endocrine    Mixed hyperlipidemia - Primary     Always had -140s, then had CAC of 124.5 on 9/23/22, so was started on crestor 20 mg daily. Then developed muscle aches (wonder if this was from statin or hypothyroidism above), so it was stopped. Lab since:  -> 274 -> 287 from 1/2/23 to 2/16/23.  Suspect this significant increase over the last month is not due to severe hypothyroidism. Before restarting a statin or zetia, we are going to wait and repeat lipids in 5 weeks with next thyroid studies after being on Synthroid.  Additionally, she does have appt with cardiology to discuss options in April.   - Repeat lipids and LFTs in 5 weeks         Relevant Orders    Lipid Profile (Chol, Trig, HDL, LDL calc)    Acquired hypothyroidism     Patient had new onset significant hypothyroidism found on labs 2/21/2023 (, FT4 <0.10). Likely Hashimotos.  She was started on Synthroid 50 mcg daily. Her significant hyperlipidemia is likely a result of hypothyroidism, question whether myalgias that were thought due to statin were instead due to hypothyroidism.  We will plan to recheck her thyroid studies in 6 weeks.  Likely will have to go up on Synthroid but wanted to start with low-dose given her age.  - Check TSH, FT4 and TPO in 5 weeks         Relevant Orders    Thyroid peroxidase antibody    TSH    T4, free    Hepatic panel (Albumin, ALT, AST, Bili, Alk Phos, TP)       Other    Elevated LFTs     New onset mild AST and ALT elevation in January of this year.  Initially thought to be secondary to starting her statin, however have remained very mildly elevated since then even  though statin has been stopped.  Question whether this may be related to her hypothyroidism.    - Abdominal US scheduled for 3/2/23  - We will plan to repeat her LFTs in 5 weeks.         Relevant Orders    Hepatic panel (Albumin, ALT, AST, Bili, Alk Phos, TP)      Ordering of each unique test  I spent a total of 35 minutes on the day of the visit.   Time spent doing chart review, history and exam, documentation and further activities per the note      Return in about 7 weeks (around 4/18/2023). as scheduled    Cassandra Valerio MD  [facility] JANE Mcpherson is a 73 year old, presenting for the following health issues:  Thyroid Problem  History of Present Illness     Hypothyroidism:   Went to [place] at end of October, walked all over - no symptoms at that time. Then after that started noticing muscle aches and pains, cold intolerance, felt like a sloth, dry skin, fatigue. She is noticing these more looking back, at the time didn't think too much of the symptoms.   - TSH 3.96 (4/6/22) -> , FT4 <0.10 (2/21/23)   - We started synthroid 50 mcg daily, she says she already feels a little better   - Tingling in hands overnight has improved, energy a little better  - ENT didn't feel anything in neck     Lipids  Always had -140s, then had CAC of 124.5 on 9/23/22, so was started on crestor 20 mg daily. Then developed muscle aches (wonder if this was from statin or hypothyroidism above), so it was stopped. Lab since:  -> 274 -> 287 from 1/2/23 to 2/16/23. AST and ALT have stayed persistently mildly elevated 60-80s   - Has appt with cardiology 4/10/23    Liver:   No prior LFT abnormalities. No alcohol use. ALT 64/AST 80 1/2023, on repeats still elevated at 45/63 (2/16/23). Liver US scheduled for 3/2/23.     Hearing Change:   Not quite back to normal. Will see [facility] in March.     Change in eye drops for glaucoma recently, she will see ophtho again soon.     She  "eats 4 or more servings of fruits and vegetables daily.She consumes 0 sweetened beverage(s) daily.She exercises with enough effort to increase her heart rate 30 to 60 minutes per day.  She exercises with enough effort to increase her heart rate 4 days per week.   She is taking medications regularly.    Review of Systems   Constitutional, HEENT, cardiovascular, pulmonary, gi and gu systems are negative, except as otherwise noted.      Objective    /71 (BP Location: Right arm, Patient Position: Right side, Cuff Size: Adult Regular)   Pulse 73   Temp 98  F (36.7  C) (Oral)   Ht 1.702 m (5' 7\")   Wt 83.9 kg (185 lb)   SpO2 97%   BMI 28.98 kg/m    Body mass index is 28.98 kg/m .  Physical Exam   GENERAL: healthy, alert and no distress  EYES: Eyes grossly normal to inspection and conjunctivae and sclerae normal  HENT: nose and mouth without ulcers or lesions  NECK: no adenopathy, no asymmetry, masses, or scars and thyroid normal to palpation  RESP: lungs clear to auscultation - no rales, rhonchi or wheezes  BREAST: normal without masses, tenderness or nipple discharge and no palpable axillary masses or adenopathy  CV: regular rate and rhythm, normal S1 S2, no S3 or S4, no murmur, click or rub, no peripheral edema and peripheral pulses strong  ABDOMEN: soft, nontender, no hepatosplenomegaly, no masses and bowel sounds normal  MS: no gross musculoskeletal defects noted, no edema  SKIN: no suspicious lesions or rashes  NEURO: Normal strength and tone, mentation intact and speech normal  PSYCH: mentation appears normal, affect normal/bright    Lab on 02/21/2023   Component Date Value Ref Range Status     TSH 02/21/2023 128.00 (H)  0.30 - 4.20 uIU/mL Final     Free T4 02/21/2023 <0.10 (L)  0.90 - 1.70 ng/dL Final               Answers for HPI/ROS submitted by the patient on 2/27/2023  Since last visit, patient describes the following symptoms:: Dry skin, Fatigue, Loose stools  How many servings of fruits and " vegetables do you eat daily?: 4 or more  On average, how many sweetened beverages do you drink each day (Examples: soda, juice, sweet tea, etc.  Do NOT count diet or artificially sweetened beverages)?: 0  How many minutes a day do you exercise enough to make your heart beat faster?: 30 to 60  How many days a week do you exercise enough to make your heart beat faster?: 4  How many days per week do you miss taking your medication?: 0

## 2023-02-28 NOTE — ASSESSMENT & PLAN NOTE
Always had -140s, then had CAC of 124.5 on 9/23/22, so was started on crestor 20 mg daily. Then developed muscle aches (wonder if this was from statin or hypothyroidism above), so it was stopped. Lab since:  -> 274 -> 287 from 1/2/23 to 2/16/23.  Suspect this significant increase over the last month is not due to severe hypothyroidism. Before restarting a statin or zetia, we are going to wait and repeat lipids in 5 weeks with next thyroid studies after being on Synthroid.  Additionally, she does have appt with cardiology to discuss options in April.   - Repeat lipids and LFTs in 5 weeks

## 2023-03-02 ENCOUNTER — HOSPITAL ENCOUNTER (OUTPATIENT)
Dept: ULTRASOUND IMAGING | Facility: HOSPITAL | Age: 73
Discharge: HOME | End: 2023-03-02
Attending: INTERNAL MEDICINE
Payer: COMMERCIAL

## 2023-03-02 ENCOUNTER — ANCILLARY PROCEDURE (OUTPATIENT)
Dept: MAMMOGRAPHY | Facility: HOSPITAL | Age: 73
End: 2023-03-02
Attending: INTERNAL MEDICINE
Payer: COMMERCIAL

## 2023-03-02 DIAGNOSIS — R79.89 ELEVATED LFTS: ICD-10-CM

## 2023-03-02 DIAGNOSIS — Z12.31 VISIT FOR SCREENING MAMMOGRAM: ICD-10-CM

## 2023-03-02 PROCEDURE — 76705 ECHO EXAM OF ABDOMEN: CPT

## 2023-03-02 PROCEDURE — 77067 SCR MAMMO BI INCL CAD: CPT

## 2023-03-09 ENCOUNTER — OFFICE VISIT (OUTPATIENT)
Dept: OTOLARYNGOLOGY | Facility: CLINIC | Age: 73
End: 2023-03-09
Payer: COMMERCIAL

## 2023-03-09 ENCOUNTER — OFFICE VISIT (OUTPATIENT)
Dept: AUDIOLOGY | Facility: CLINIC | Age: 73
End: 2023-03-09
Payer: COMMERCIAL

## 2023-03-09 ENCOUNTER — PRE VISIT (OUTPATIENT)
Dept: OTOLARYNGOLOGY | Facility: CLINIC | Age: 73
End: 2023-03-09

## 2023-03-09 VITALS
TEMPERATURE: 97.9 F | BODY MASS INDEX: 29.03 KG/M2 | OXYGEN SATURATION: 100 % | HEART RATE: 80 BPM | HEIGHT: 67 IN | SYSTOLIC BLOOD PRESSURE: 118 MMHG | DIASTOLIC BLOOD PRESSURE: 77 MMHG | WEIGHT: 185 LBS

## 2023-03-09 DIAGNOSIS — H90.A31 MIXED CONDUCTIVE AND SENSORINEURAL HEARING LOSS OF RIGHT EAR WITH RESTRICTED HEARING OF LEFT EAR: ICD-10-CM

## 2023-03-09 DIAGNOSIS — H90.6 MIXED HEARING LOSS, BILATERAL: ICD-10-CM

## 2023-03-09 DIAGNOSIS — R09.81 NASAL CONGESTION: Primary | ICD-10-CM

## 2023-03-09 DIAGNOSIS — H90.A22 SENSORINEURAL HEARING LOSS (SNHL) OF LEFT EAR WITH RESTRICTED HEARING OF RIGHT EAR: Primary | ICD-10-CM

## 2023-03-09 PROCEDURE — 92504 EAR MICROSCOPY EXAMINATION: CPT | Performed by: REGISTERED NURSE

## 2023-03-09 PROCEDURE — 99203 OFFICE O/P NEW LOW 30 MIN: CPT | Mod: 25 | Performed by: REGISTERED NURSE

## 2023-03-09 PROCEDURE — 92550 TYMPANOMETRY & REFLEX THRESH: CPT | Performed by: AUDIOLOGIST

## 2023-03-09 PROCEDURE — 92557 COMPREHENSIVE HEARING TEST: CPT | Performed by: AUDIOLOGIST

## 2023-03-09 ASSESSMENT — PAIN SCALES - GENERAL: PAINLEVEL: NO PAIN (0)

## 2023-03-09 NOTE — PROGRESS NOTES
AUDIOLOGY REPORT    SUMMARY: Audiology visit completed. See audiogram for results.      RECOMMENDATIONS: Follow-up with ENT.       Cassandra Cid M.A.  Audiology Doctoral Extern  [place] #[id number]      I was present with the patient for the entire Audiology appointment including all procedures/testing performed by the AuD student, and agree with the student s assessment and plan as documented.      Arely Guaman, Hao  Audiologist  [place] License  #[id number]

## 2023-03-09 NOTE — PROGRESS NOTES
Otolaryngology Clinic  March 9, 2023    Chief Complaint:   Hearing loss      History of Present Illness:   Vida Najera is a 73 year old female who presents today for hearing loss. Patient reports a sudden decrease in right hearing in November 2022. Seen by outside ENT with audiogram demonstrating asymmetric right hearing loss from 250-2000 Hz. MRI ordered at that time was normal. Patient also had significant nasal congestion, sinusitis and throat irritation. These symptoms have improved except for hearing. Feels that hearing is stable since initial loss. Family history of hearing loss. Patient denies any otalgia, otorrhea, dizziness, facial numbness/weakness, history of frequent ear infections, or ear surgeries.     Past Medical History:  No past medical history on file.    Past Surgical History:  Past Surgical History:   Procedure Laterality Date     BIOPSY BREAST Right 2002     HYSTERECTOMY  2005     OOPHORECTOMY Bilateral 2005       Medications:  Current Outpatient Medications   Medication Sig Dispense Refill     acyclovir (ZOVIRAX) 400 MG tablet [ACYCLOVIR (ZOVIRAX) 400 MG TABLET] Take 400 mg by mouth 2 (two) times a day.       aspirin (ASA) 81 MG EC tablet Take 1 tablet (81 mg) by mouth daily 100 tablet 3     cholecalciferol, vitamin D3, 50 mcg (2,000 unit) capsule [CHOLECALCIFEROL, VITAMIN D3, 50 MCG (2,000 UNIT) CAPSULE] 1 capsule daily.       levothyroxine (SYNTHROID/LEVOTHROID) 50 MCG tablet Take 1 tablet (50 mcg) by mouth daily 60 tablet 1     PROPYLENE GLYCOL/ (SYSTANE ULTRA OPHT) [PROPYLENE GLYCOL/ (SYSTANE ULTRA OPHT)] Apply to eye 2 (two) times a day.       timoloL maleate (TIMOPTIC) 0.5 % ophthalmic solution [TIMOLOL MALEATE (TIMOPTIC) 0.5 % OPHTHALMIC SOLUTION] Administer 1 drop into the left eye daily.        vit C,A-Pj-qdqqb-lutein-zeaxan (PRESERVISION AREDS-2) 250-90-40-1 mg cap [VIT C,R-DE-QAGFV-LUTEIN-ZEAXAN (PRESERVISION AREDS-2) 250-90-40-1 MG CAP]          Allergies:  No  Known Allergies     Social History:  Social History     Tobacco Use     Smoking status: Never     Smokeless tobacco: Never   Vaping Use     Vaping Use: Never used       ROS: 10 point ROS neg other than the symptoms noted above in the HPI.    Physical Exam:    There were no vitals taken for this visit.     Constitutional:  The patient was unaccompanied, well-groomed, and in no acute distress.     Neurologic: Alert and oriented x 3.  CN's III-XII within normal limits.  Voice normal.   Psychiatric: The patient's affect was calm, cooperative, and appropriate.    Communication:  Normal; communicates verbally, normal voice quality.   Respiratory: Breathing comfortably without stridor or exertion of accessory muscles.   Ears: Pinnae and tragus non-tender.  EAC's and TM's were clear.        Otologic microscope exam:    Right ear was examined under the microscope.  Normal appearing TM, nicely aerated middle ear space.    Left ear was also examined under the microscope.  Normal appearing TM, nicely aerated middle ear space.       Audiogram: 3/9/2023- data independently reviewed  Right ear: Moderate mixed hearing loss (20-35 dB asymmetry from 250-2000 Hz)   Left ear: Normal sloping to moderate SNHL   WR right: 100% at 85 dB left: 92% at 65 dB  Acoustic Reflexes: Present in left ipsi. Absent in all other conditions.  Tympanograms: type A bilaterally      Assessment and Plan:  1. Mixed hearing loss, bilateral  Patient with bilateral hearing loss with asymmetric right mixed hearing loss. Reviewed audiogram with patient today that appears stable from previous audiogram in December 2022 from [facility] that she brought today. Imaging negative for a retrocochlear lesion.     Patient is a hearing aid candidate bilaterally and is medically cleared for hearing aids. Discussed with patient that there is a trial period prior to committing to purchase hearing aids. Patient can use this trial to determine if hearing aid benefit would be  worth the cost of these devices. Patient is interested in proceeding with a hearing aid trial but will likely schedule this closer to home.     Patient will follow up in 1 year with audiogram. Sooner with any sudden change in hearing.     Bethany Ward DNP, APRN, CNP  Otolaryngology  Head & Neck Surgery  [phone]    30 minutes spent on the date of the encounter doing chart review, history and exam, documentation and further activities per the note

## 2023-03-09 NOTE — LETTER
3/9/2023       RE: Vida Najera  [address]  [facility] [id number]     Dear Colleague,    Thank you for referring your patient, Vida Najera, to the [facility] [place] [facility]. Please see a copy of my visit note below.      Otolaryngology Clinic  March 9, 2023    Chief Complaint:   Hearing loss      History of Present Illness:   Vida Najera is a 73 year old female who presents today for hearing loss. Patient reports a sudden decrease in right hearing in November 2022. Seen by outside ENT with audiogram demonstrating asymmetric right hearing loss from 250-2000 Hz. MRI ordered at that time was normal. Patient also had significant nasal congestion, sinusitis and throat irritation. These symptoms have improved except for hearing. Feels that hearing is stable since initial loss. Family history of hearing loss. Patient denies any otalgia, otorrhea, dizziness, facial numbness/weakness, history of frequent ear infections, or ear surgeries.     Past Medical History:  No past medical history on file.    Past Surgical History:  Past Surgical History:   Procedure Laterality Date     BIOPSY BREAST Right 2002     HYSTERECTOMY  2005     OOPHORECTOMY Bilateral 2005       Medications:  Current Outpatient Medications   Medication Sig Dispense Refill     acyclovir (ZOVIRAX) 400 MG tablet [ACYCLOVIR (ZOVIRAX) 400 MG TABLET] Take 400 mg by mouth 2 (two) times a day.       aspirin (ASA) 81 MG EC tablet Take 1 tablet (81 mg) by mouth daily 100 tablet 3     cholecalciferol, vitamin D3, 50 mcg (2,000 unit) capsule [CHOLECALCIFEROL, VITAMIN D3, 50 MCG (2,000 UNIT) CAPSULE] 1 capsule daily.       levothyroxine (SYNTHROID/LEVOTHROID) 50 MCG tablet Take 1 tablet (50 mcg) by mouth daily 60 tablet 1     PROPYLENE GLYCOL/ (SYSTANE ULTRA OPHT) [PROPYLENE GLYCOL/ (SYSTANE ULTRA OPHT)] Apply to eye 2 (two) times a day.       timoloL  maleate (TIMOPTIC) 0.5 % ophthalmic solution [TIMOLOL MALEATE (TIMOPTIC) 0.5 % OPHTHALMIC SOLUTION] Administer 1 drop into the left eye daily.        vit C,F-Cl-xcvee-lutein-zeaxan (PRESERVISION AREDS-2) 250-90-40-1 mg cap [VIT C,O-ZX-JSKYE-LUTEIN-ZEAXAN (PRESERVISION AREDS-2) 250-90-40-1 MG CAP]          Allergies:  No Known Allergies     Social History:  Social History     Tobacco Use     Smoking status: Never     Smokeless tobacco: Never   Vaping Use     Vaping Use: Never used       ROS: 10 point ROS neg other than the symptoms noted above in the HPI.    Physical Exam:    There were no vitals taken for this visit.     Constitutional:  The patient was unaccompanied, well-groomed, and in no acute distress.     Neurologic: Alert and oriented x 3.  CN's III-XII within normal limits.  Voice normal.   Psychiatric: The patient's affect was calm, cooperative, and appropriate.    Communication:  Normal; communicates verbally, normal voice quality.   Respiratory: Breathing comfortably without stridor or exertion of accessory muscles.   Ears: Pinnae and tragus non-tender.  EAC's and TM's were clear.        Otologic microscope exam:    Right ear was examined under the microscope.  Normal appearing TM, nicely aerated middle ear space.    Left ear was also examined under the microscope.  Normal appearing TM, nicely aerated middle ear space.       Audiogram: 3/9/2023- data independently reviewed  Right ear: Moderate mixed hearing loss (20-35 dB asymmetry from 250-2000 Hz)   Left ear: Normal sloping to moderate SNHL   WR right: 100% at 85 dB left: 92% at 65 dB  Acoustic Reflexes: Present in left ipsi. Absent in all other conditions.  Tympanograms: type A bilaterally      Assessment and Plan:  1. Mixed hearing loss, bilateral  Patient with bilateral hearing loss with asymmetric right mixed hearing loss. Reviewed audiogram with patient today that appears stable from previous audiogram in December 2022 from [facility] that she  brought today. Imaging negative for a retrocochlear lesion.     Patient is a hearing aid candidate bilaterally and is medically cleared for hearing aids. Discussed with patient that there is a trial period prior to committing to purchase hearing aids. Patient can use this trial to determine if hearing aid benefit would be worth the cost of these devices. Patient is interested in proceeding with a hearing aid trial but will likely schedule this closer to home.     Patient will follow up in 1 year with audiogram. Sooner with any sudden change in hearing.     Bethany Ward DNP, APRN, CNP  Otolaryngology  Head & Neck Surgery  [phone]    30 minutes spent on the date of the encounter doing chart review, history and exam, documentation and further activities per the note

## 2023-03-27 DIAGNOSIS — J32.9 CHRONIC RHINOSINUSITIS: ICD-10-CM

## 2023-03-28 RX ORDER — FLUTICASONE PROPIONATE 50 MCG
1 SPRAY, SUSPENSION (ML) NASAL DAILY
Qty: 9.9 ML | Refills: 1 | Status: SHIPPED | OUTPATIENT
Start: 2023-03-28 | End: 2023-11-29

## 2023-03-28 NOTE — TELEPHONE ENCOUNTER
"Routing refill request to provider for review/approval because:  Drug not active on patient's medication list    Last Written Prescription Date:  1/27/2023 End: 2/28/2023  Last Fill Quantity: 9.9mL,  # refills: 1   Last office visit provider:  2/28/2023     Requested Prescriptions   Pending Prescriptions Disp Refills     fluticasone (FLONASE) 50 MCG/ACT nasal spray 9.9 mL 1     Sig: Spray 1 spray into both nostrils daily       Nasal Allergy Protocol Failed - 3/27/2023  2:26 PM        Failed - Medication is active on med list        Passed - Patient is age 12 or older        Passed - Recent (12 mo) or future (30 days) visit within the authorizing provider's specialty     Patient has had an office visit with the authorizing provider or a provider within the authorizing providers department within the previous 12 mos or has a future within next 30 days. See \"Patient Info\" tab in inbasket, or \"Choose Columns\" in Meds & Orders section of the refill encounter.                   Tova Espinal RN 03/28/23 4:36 PM  "

## 2023-04-03 ENCOUNTER — MYC MEDICAL ADVICE (OUTPATIENT)
Dept: INTERNAL MEDICINE | Facility: CLINIC | Age: 73
End: 2023-04-03

## 2023-04-03 ENCOUNTER — LAB (OUTPATIENT)
Dept: LAB | Facility: CLINIC | Age: 73
End: 2023-04-03
Payer: COMMERCIAL

## 2023-04-03 DIAGNOSIS — R79.89 ELEVATED LFTS: ICD-10-CM

## 2023-04-03 DIAGNOSIS — E78.2 MIXED HYPERLIPIDEMIA: ICD-10-CM

## 2023-04-03 DIAGNOSIS — E03.9 ACQUIRED HYPOTHYROIDISM: ICD-10-CM

## 2023-04-03 LAB
ALBUMIN SERPL BCG-MCNC: 4.3 G/DL (ref 3.5–5.2)
ALP SERPL-CCNC: 83 U/L (ref 35–104)
ALT SERPL W P-5'-P-CCNC: 17 U/L (ref 10–35)
AST SERPL W P-5'-P-CCNC: 20 U/L (ref 10–35)
BILIRUB DIRECT SERPL-MCNC: <0.2 MG/DL (ref 0–0.3)
BILIRUB SERPL-MCNC: 0.4 MG/DL
CHOLEST SERPL-MCNC: 236 MG/DL
HDLC SERPL-MCNC: 50 MG/DL
LDLC SERPL CALC-MCNC: 156 MG/DL
NONHDLC SERPL-MCNC: 186 MG/DL
PROT SERPL-MCNC: 7.5 G/DL (ref 6.4–8.3)
T4 FREE SERPL-MCNC: 0.76 NG/DL (ref 0.9–1.7)
TRIGL SERPL-MCNC: 148 MG/DL
TSH SERPL DL<=0.005 MIU/L-ACNC: 50.9 UIU/ML (ref 0.3–4.2)

## 2023-04-03 PROCEDURE — 80076 HEPATIC FUNCTION PANEL: CPT

## 2023-04-03 PROCEDURE — 84439 ASSAY OF FREE THYROXINE: CPT

## 2023-04-03 PROCEDURE — 80061 LIPID PANEL: CPT

## 2023-04-03 PROCEDURE — 36415 COLL VENOUS BLD VENIPUNCTURE: CPT

## 2023-04-03 PROCEDURE — 86376 MICROSOMAL ANTIBODY EACH: CPT

## 2023-04-03 PROCEDURE — 84443 ASSAY THYROID STIM HORMONE: CPT

## 2023-04-03 RX ORDER — LEVOTHYROXINE SODIUM 75 UG/1
75 TABLET ORAL DAILY
Qty: 90 TABLET | Refills: 1 | Status: SHIPPED | OUTPATIENT
Start: 2023-04-03 | End: 2023-07-26

## 2023-04-04 LAB — THYROPEROXIDASE AB SERPL-ACNC: <10 IU/ML

## 2023-04-04 NOTE — RESULT ENCOUNTER NOTE
The 10-year ASCVD risk score (Cristofer FAITH, et al., 2019) is: 10.6%    Things are looking better, not quite back to normal. We will increase your thyroid medication to 75 mcg daily from 50. I just sent an updated prescription to your pharmacy. We will need to repeat the levels in 6-8 weeks again, I'm also placing the lab order now.     Your cholesterol is coming down, still elevated. Overall, your 10 year risk calculation is 10.6% with these numbers - a level where we would recommend starting a medication. You can discuss further with cardiology and me when I see you again soon.

## 2023-04-10 ENCOUNTER — OFFICE VISIT (OUTPATIENT)
Dept: CARDIOLOGY | Facility: CLINIC | Age: 73
End: 2023-04-10
Payer: COMMERCIAL

## 2023-04-10 VITALS
HEIGHT: 67 IN | SYSTOLIC BLOOD PRESSURE: 108 MMHG | DIASTOLIC BLOOD PRESSURE: 68 MMHG | BODY MASS INDEX: 28.41 KG/M2 | WEIGHT: 181 LBS | RESPIRATION RATE: 20 BRPM | HEART RATE: 87 BPM

## 2023-04-10 DIAGNOSIS — I25.10 CORONARY ARTERY CALCIFICATION SEEN ON CT SCAN: ICD-10-CM

## 2023-04-10 DIAGNOSIS — Z82.49 FAMILY HISTORY OF PREMATURE CAD: ICD-10-CM

## 2023-04-10 DIAGNOSIS — E03.9 ACQUIRED HYPOTHYROIDISM: ICD-10-CM

## 2023-04-10 DIAGNOSIS — E78.5 HYPERLIPIDEMIA, UNSPECIFIED HYPERLIPIDEMIA TYPE: Primary | ICD-10-CM

## 2023-04-10 PROCEDURE — 99204 OFFICE O/P NEW MOD 45 MIN: CPT | Performed by: GENERAL ACUTE CARE HOSPITAL

## 2023-04-10 RX ORDER — ROSUVASTATIN CALCIUM 5 MG/1
5 TABLET, COATED ORAL DAILY
Qty: 90 TABLET | Refills: 3 | Status: SHIPPED | OUTPATIENT
Start: 2023-04-10 | End: 2024-04-02

## 2023-04-10 RX ORDER — DORZOLAMIDE HYDROCHLORIDE AND TIMOLOL MALEATE 20; 5 MG/ML; MG/ML
SOLUTION/ DROPS OPHTHALMIC
COMMUNITY
Start: 2023-03-08

## 2023-04-10 NOTE — PATIENT INSTRUCTIONS
We will try rosuvastatin at a lower dose of 5 mg daily.  We can repeat labs in 2 months.  Let me know if you have more muscle aches.  See me back in 2 months.

## 2023-04-10 NOTE — PROGRESS NOTES
HEART CARE ENCOUNTER NOTE      Thank you, Dr. Valerio, [facility], for asking the [facility] team to see Ms. Vida Najera to evaluate hyperlipidemia.    Assessment/Recommendations   Assessment:    Hyperlipidemia. Her recent sudden worsening of hyperlipidemia was likely due to untreated hypothyroidism although she may have a polygenetic form of familial hyperlipidemia.  Coronary artery calcification with Agatston score of 124.5 noted 9/23/2022. She denies anginal symptoms.  Family history of premature coronary artery disease.  BMI 28.35.    Plan:  We will try rosuvastatin at a lower dose of 5 mg once daily and check fasting lipids and liver transaminases in 2 months.  If she does not tolerate a low dose of rosuvastatin, then I would next try pravastatin 40 mg daily.  If she does not tolerate pravastatin, then we may need to try a PSCK9 inhibitor.  Continue aspirin 81 mg daily.  Recommended regular exercise and eating a Mediterranean diet.  Follow-up with me in 1 year.         History of Present Illness   Ms. Vida Najera is a 73 year old female with a significant past history of HLD, recently diagnosed hypothyroidism, and a family history of premature CAD presenting for lipid management. She started rosuvastatin 20 mg daily 11/4/2022 for a longstanding history of hyperlipidemia and a coronary artery calcium score of 124.5 noted on CT 9/23/2022. She seemed to be doing okay with this but then had elevated LFTs noted on follow-up labs. Around this time, she also felt muscle aches. Rosuvastatin was stopped. Interestingly her cholesterol numbers seemed to be worsening. Then on 2/21/2023 she was found to have a TSH of 128 and subsequently started thyroid replacement. She is feeling much better now and has lost 12-14 pounds.     She exercises regularly and feels well with this. No chest pain/pressure/tightness, shortness of breath at rest or with exertion, light headedness/dizziness, pre-syncope,  "syncope, lower extremity swelling, palpitations, paroxysmal nocturnal dyspnea (PND), or orthopnea. Her father had an MI at age 32 and multiple subsequent MI before dying in his 50s. Her brother had stents and a pacemaker.     Cardiac Problems and Cardiac Diagnostics     Most Recent Cardiac testing:    Coronary CT calcium score 9/23/2022 (report reviewed):  Total Agatston score of 124.5     Medications  Allergies   Current Outpatient Medications   Medication Sig Dispense Refill     acyclovir (ZOVIRAX) 400 MG tablet [ACYCLOVIR (ZOVIRAX) 400 MG TABLET] Take 400 mg by mouth 2 (two) times a day.       aspirin (ASA) 81 MG EC tablet Take 1 tablet (81 mg) by mouth daily 100 tablet 3     cholecalciferol, vitamin D3, 50 mcg (2,000 unit) capsule [CHOLECALCIFEROL, VITAMIN D3, 50 MCG (2,000 UNIT) CAPSULE] 1 capsule daily.       levothyroxine (SYNTHROID/LEVOTHROID) 75 MCG tablet Take 1 tablet (75 mcg) by mouth daily 90 tablet 1     PROPYLENE GLYCOL/ (SYSTANE ULTRA OPHT) [PROPYLENE GLYCOL/ (SYSTANE ULTRA OPHT)] Apply to eye 2 (two) times a day.       vit C,T-On-sngyg-lutein-zeaxan (PRESERVISION AREDS-2) 250-90-40-1 mg cap [VIT C,Z-LO-ZKIDO-LUTEIN-ZEAXAN (PRESERVISION AREDS-2) 250-90-40-1 MG CAP]        dorzolamide-timolol (COSOPT) 2-0.5 % ophthalmic solution INSTILL 1 DROP BY OPHTHALMIC ROUTE 2 TIMES A DAY INTO THE LEFT EYE       fluticasone (FLONASE) 50 MCG/ACT nasal spray Spray 1 spray into both nostrils daily 9.9 mL 1      No Known Allergies     Physical Examination Review of Systems   /68 (BP Location: Left arm, Patient Position: Sitting, Cuff Size: Adult Large)   Pulse 87   Resp 20   Ht 1.702 m (5' 7\")   Wt 82.1 kg (181 lb)   BMI 28.35 kg/m    Body mass index is 28.35 kg/m .  Wt Readings from Last 3 Encounters:   04/10/23 82.1 kg (181 lb)   03/09/23 83.9 kg (185 lb)   02/28/23 83.9 kg (185 lb)       General Appearance:   Pleasant female, appears stated age. no acute distress, normal body habitus "   ENT/Mouth: Facemask.     EYES:  no scleral icterus, normal conjunctivae   Neck: no carotid bruits. No anterior cervical lymphadenopaty   Respiratory:   lungs are clear to auscultation, no rales or wheezing, equal chest wall expansion    Cardiovascular:   Regular rhythm, normal rate. Normal first and second heart sounds with no murmurs, rubs, or gallops; the carotid, radial and posterior tibial pulses are intact, Jugular venous pressure normal, no edema bilaterally    Abdomen/GI:  no organomegaly, masses, bruits, or tenderness; bowel sounds are present   Extremities: no cyanosis or clubbing   Skin: no xanthelasma, warm.    Heme/lymph/ Immunology No apparent bleeding noted.   Neurologic: Alert and oriented. normal gait, no tremors     Psychiatric: Pleasant, calm, appropriate affect.    A complete 10 system review of systems was performed and is negative except as mentioned in the HPI/subjective.         Past History   Past Medical History:   Past Medical History:   Diagnosis Date     Hyperlipidemia      Hypothyroidism        Past Surgical History:   Past Surgical History:   Procedure Laterality Date     BIOPSY BREAST Right 2002     HYSTERECTOMY  2005     OOPHORECTOMY Bilateral 2005       Family History:   Family History   Problem Relation Age of Onset     Dementia Mother      Parathyroid Disorders Sister      Celiac Disease Daughter      No Known Problems Maternal Grandmother      No Known Problems Maternal Grandfather      No Known Problems Paternal Grandmother      No Known Problems Paternal Grandfather      No Known Problems Maternal Aunt      No Known Problems Paternal Aunt      Coronary Artery Disease Father         And Grandpa      Celiac Disease Daughter         &Brother     Heart Disease Brother      Hereditary Breast and Ovarian Cancer Syndrome No family hx of      Breast Cancer No family hx of      Cancer No family hx of      Colon Cancer No family hx of      Endometrial Cancer No family hx of      Ovarian  Cancer No family hx of         Social History:   Social History     Socioeconomic History     Marital status:      Spouse name: Not on file     Number of children: Not on file     Years of education: Not on file     Highest education level: Not on file   Occupational History     Not on file   Tobacco Use     Smoking status: Never     Smokeless tobacco: Never   Vaping Use     Vaping status: Never Used   Substance and Sexual Activity     Alcohol use: Not on file     Drug use: Not on file     Sexual activity: Not on file   Other Topics Concern     Not on file   Social History Narrative     Not on file     Social Determinants of Health     Financial Resource Strain: Not on file   Food Insecurity: Not on file   Transportation Needs: Not on file   Physical Activity: Not on file   Stress: Not on file   Social Connections: Not on file   Intimate Partner Violence: Not on file   Housing Stability: Not on file              Lab Results    Chemistry/lipid CBC Cardiac Enzymes/BNP/TSH/INR   Lab Results   Component Value Date    CHOL 236 (H) 04/03/2023    HDL 50 04/03/2023     (H) 04/03/2023    TRIG 148 04/03/2023    CR 0.77 04/06/2022    BUN 17 04/06/2022    POTASSIUM 5.3 (H) 04/06/2022     04/06/2022    CO2 24 04/06/2022      Lab Results   Component Value Date    WBC 4.4 04/06/2022    HGB 14.1 04/06/2022    HCT 42.5 04/06/2022    MCV 90 04/06/2022     04/06/2022    A1C 6.1 (H) 01/02/2023     Lab Results   Component Value Date    A1C 6.1 (H) 01/02/2023    Lab Results   Component Value Date    TSH 50.90 (H) 04/03/2023          Amilcar Yap MD [facility]  Non-Invasive Cardiologist  [facility]  Pager [phone]

## 2023-04-10 NOTE — LETTER
4/10/2023    Cassandra Valerio MD  [address] [facility] [address]    RE: Vida Najera       Dear Colleague,     I had the pleasure of seeing Vida Najera in the [facility].  HEART CARE ENCOUNTER NOTE      Thank you, Dr. Valerio, Cassandra Cook, for asking the [facility] team to see Ms. Vida Najera to evaluate hyperlipidemia.    Assessment/Recommendations   Assessment:    Hyperlipidemia. Her recent sudden worsening of hyperlipidemia was likely due to untreated hypothyroidism although she may have a polygenetic form of familial hyperlipidemia.  Coronary artery calcification with Agatston score of 124.5 noted 9/23/2022. She denies anginal symptoms.  Family history of premature coronary artery disease.  BMI 28.35.    Plan:  We will try rosuvastatin at a lower dose of 5 mg once daily and check fasting lipids and liver transaminases in 2 months.  If she does not tolerate a low dose of rosuvastatin, then I would next try pravastatin 40 mg daily.  If she does not tolerate pravastatin, then we may need to try a PSCK9 inhibitor.  Continue aspirin 81 mg daily.  Recommended regular exercise and eating a Mediterranean diet.  Follow-up with me in 1 year.         History of Present Illness   Ms. Vida Najera is a 73 year old female with a significant past history of HLD, recently diagnosed hypothyroidism, and a family history of premature CAD presenting for lipid management. She started rosuvastatin 20 mg daily 11/4/2022 for a longstanding history of hyperlipidemia and a coronary artery calcium score of 124.5 noted on CT 9/23/2022. She seemed to be doing okay with this but then had elevated LFTs noted on follow-up labs. Around this time, she also felt muscle aches. Rosuvastatin was stopped. Interestingly her cholesterol numbers seemed to be worsening. Then on 2/21/2023 she was found to have a TSH of 128 and subsequently started thyroid replacement. She is feeling  "much better now and has lost 12-14 pounds.     She exercises regularly and feels well with this. No chest pain/pressure/tightness, shortness of breath at rest or with exertion, light headedness/dizziness, pre-syncope, syncope, lower extremity swelling, palpitations, paroxysmal nocturnal dyspnea (PND), or orthopnea. Her father had an MI at age 32 and multiple subsequent MI before dying in his 50s. Her brother had stents and a pacemaker.     Cardiac Problems and Cardiac Diagnostics     Most Recent Cardiac testing:    Coronary CT calcium score 9/23/2022 (report reviewed):  Total Agatston score of 124.5     Medications  Allergies   Current Outpatient Medications   Medication Sig Dispense Refill    acyclovir (ZOVIRAX) 400 MG tablet [ACYCLOVIR (ZOVIRAX) 400 MG TABLET] Take 400 mg by mouth 2 (two) times a day.      aspirin (ASA) 81 MG EC tablet Take 1 tablet (81 mg) by mouth daily 100 tablet 3    cholecalciferol, vitamin D3, 50 mcg (2,000 unit) capsule [CHOLECALCIFEROL, VITAMIN D3, 50 MCG (2,000 UNIT) CAPSULE] 1 capsule daily.      levothyroxine (SYNTHROID/LEVOTHROID) 75 MCG tablet Take 1 tablet (75 mcg) by mouth daily 90 tablet 1    PROPYLENE GLYCOL/ (SYSTANE ULTRA OPHT) [PROPYLENE GLYCOL/ (SYSTANE ULTRA OPHT)] Apply to eye 2 (two) times a day.      vit C,C-Cm-dcrdd-lutein-zeaxan (PRESERVISION AREDS-2) 250-90-40-1 mg cap [VIT C,G-HP-LRKKE-LUTEIN-ZEAXAN (PRESERVISION AREDS-2) 250-90-40-1 MG CAP]       dorzolamide-timolol (COSOPT) 2-0.5 % ophthalmic solution INSTILL 1 DROP BY OPHTHALMIC ROUTE 2 TIMES A DAY INTO THE LEFT EYE      fluticasone (FLONASE) 50 MCG/ACT nasal spray Spray 1 spray into both nostrils daily 9.9 mL 1      No Known Allergies     Physical Examination Review of Systems   /68 (BP Location: Left arm, Patient Position: Sitting, Cuff Size: Adult Large)   Pulse 87   Resp 20   Ht 1.702 m (5' 7\")   Wt 82.1 kg (181 lb)   BMI 28.35 kg/m    Body mass index is 28.35 kg/m .  Wt Readings from " Last 3 Encounters:   04/10/23 82.1 kg (181 lb)   03/09/23 83.9 kg (185 lb)   02/28/23 83.9 kg (185 lb)       General Appearance:   Pleasant female, appears stated age. no acute distress, normal body habitus   ENT/Mouth: Facemask.     EYES:  no scleral icterus, normal conjunctivae   Neck: no carotid bruits. No anterior cervical lymphadenopaty   Respiratory:   lungs are clear to auscultation, no rales or wheezing, equal chest wall expansion    Cardiovascular:   Regular rhythm, normal rate. Normal first and second heart sounds with no murmurs, rubs, or gallops; the carotid, radial and posterior tibial pulses are intact, Jugular venous pressure normal, no edema bilaterally    Abdomen/GI:  no organomegaly, masses, bruits, or tenderness; bowel sounds are present   Extremities: no cyanosis or clubbing   Skin: no xanthelasma, warm.    Heme/lymph/ Immunology No apparent bleeding noted.   Neurologic: Alert and oriented. normal gait, no tremors     Psychiatric: Pleasant, calm, appropriate affect.    A complete 10 system review of systems was performed and is negative except as mentioned in the HPI/subjective.         Past History   Past Medical History:   Past Medical History:   Diagnosis Date    Hyperlipidemia     Hypothyroidism        Past Surgical History:   Past Surgical History:   Procedure Laterality Date    BIOPSY BREAST Right 2002    HYSTERECTOMY  2005    OOPHORECTOMY Bilateral 2005       Family History:   Family History   Problem Relation Age of Onset    Dementia Mother     Parathyroid Disorders Sister     Celiac Disease Daughter     No Known Problems Maternal Grandmother     No Known Problems Maternal Grandfather     No Known Problems Paternal Grandmother     No Known Problems Paternal Grandfather     No Known Problems Maternal Aunt     No Known Problems Paternal Aunt     Coronary Artery Disease Father         And Grandpa     Celiac Disease Daughter         &Brother    Heart Disease Brother     Hereditary Breast and  Ovarian Cancer Syndrome No family hx of     Breast Cancer No family hx of     Cancer No family hx of     Colon Cancer No family hx of     Endometrial Cancer No family hx of     Ovarian Cancer No family hx of         Social History:   Social History     Socioeconomic History    Marital status:      Spouse name: Not on file    Number of children: Not on file    Years of education: Not on file    Highest education level: Not on file   Occupational History    Not on file   Tobacco Use    Smoking status: Never    Smokeless tobacco: Never   Vaping Use    Vaping status: Never Used   Substance and Sexual Activity    Alcohol use: Not on file    Drug use: Not on file    Sexual activity: Not on file   Other Topics Concern    Not on file   Social History Narrative    Not on file     Social Determinants of Health     Financial Resource Strain: Not on file   Food Insecurity: Not on file   Transportation Needs: Not on file   Physical Activity: Not on file   Stress: Not on file   Social Connections: Not on file   Intimate Partner Violence: Not on file   Housing Stability: Not on file              Lab Results    Chemistry/lipid CBC Cardiac Enzymes/BNP/TSH/INR   Lab Results   Component Value Date    CHOL 236 (H) 04/03/2023    HDL 50 04/03/2023     (H) 04/03/2023    TRIG 148 04/03/2023    CR 0.77 04/06/2022    BUN 17 04/06/2022    POTASSIUM 5.3 (H) 04/06/2022     04/06/2022    CO2 24 04/06/2022      Lab Results   Component Value Date    WBC 4.4 04/06/2022    HGB 14.1 04/06/2022    HCT 42.5 04/06/2022    MCV 90 04/06/2022     04/06/2022    A1C 6.1 (H) 01/02/2023     Lab Results   Component Value Date    A1C 6.1 (H) 01/02/2023    Lab Results   Component Value Date    TSH 50.90 (H) 04/03/2023          Amilcar Yap MD [facility]  Non-Invasive Cardiologist  [facility]  Pager [phone]        Thank you for allowing me to participate in the care of your patient.      Sincerely,     Amilcar Yap MD      [facility]  cc:   Cassandra Valerio MD  [address] [place] [address]

## 2023-04-13 ASSESSMENT — ENCOUNTER SYMPTOMS
NAUSEA: 0
NERVOUS/ANXIOUS: 0
ABDOMINAL PAIN: 0
JOINT SWELLING: 0
PALPITATIONS: 0
FEVER: 0
MYALGIAS: 0
EYE PAIN: 0
HEADACHES: 0
DIARRHEA: 0
BREAST MASS: 0
FREQUENCY: 0
HEARTBURN: 0
SORE THROAT: 0
DIZZINESS: 0
WEAKNESS: 0
PARESTHESIAS: 0
HEMATOCHEZIA: 0
DYSURIA: 0
COUGH: 0
CHILLS: 0
CONSTIPATION: 0
ARTHRALGIAS: 0
SHORTNESS OF BREATH: 0
HEMATURIA: 0

## 2023-04-13 ASSESSMENT — ACTIVITIES OF DAILY LIVING (ADL): CURRENT_FUNCTION: NO ASSISTANCE NEEDED

## 2023-04-14 ENCOUNTER — OFFICE VISIT (OUTPATIENT)
Dept: INTERNAL MEDICINE | Facility: CLINIC | Age: 73
End: 2023-04-14
Payer: COMMERCIAL

## 2023-04-14 VITALS
DIASTOLIC BLOOD PRESSURE: 75 MMHG | OXYGEN SATURATION: 98 % | HEART RATE: 66 BPM | TEMPERATURE: 98.7 F | BODY MASS INDEX: 27.73 KG/M2 | HEIGHT: 67 IN | WEIGHT: 176.7 LBS | SYSTOLIC BLOOD PRESSURE: 117 MMHG

## 2023-04-14 DIAGNOSIS — M89.9 DISORDER OF BONE AND CARTILAGE: ICD-10-CM

## 2023-04-14 DIAGNOSIS — E55.9 VITAMIN D DEFICIENCY: ICD-10-CM

## 2023-04-14 DIAGNOSIS — R73.03 PREDIABETES: ICD-10-CM

## 2023-04-14 DIAGNOSIS — M94.9 DISORDER OF BONE AND CARTILAGE: ICD-10-CM

## 2023-04-14 DIAGNOSIS — E78.2 MIXED HYPERLIPIDEMIA: ICD-10-CM

## 2023-04-14 DIAGNOSIS — E03.9 ACQUIRED HYPOTHYROIDISM: ICD-10-CM

## 2023-04-14 DIAGNOSIS — H90.6 MIXED HEARING LOSS, BILATERAL: ICD-10-CM

## 2023-04-14 DIAGNOSIS — Z00.00 ENCOUNTER FOR MEDICARE ANNUAL WELLNESS EXAM: Primary | ICD-10-CM

## 2023-04-14 PROCEDURE — G0439 PPPS, SUBSEQ VISIT: HCPCS | Performed by: INTERNAL MEDICINE

## 2023-04-14 PROCEDURE — 99214 OFFICE O/P EST MOD 30 MIN: CPT | Mod: 25 | Performed by: INTERNAL MEDICINE

## 2023-04-14 ASSESSMENT — ENCOUNTER SYMPTOMS
HEARTBURN: 0
PALPITATIONS: 0
DIARRHEA: 0
ABDOMINAL PAIN: 0
DYSURIA: 0
JOINT SWELLING: 0
COUGH: 0
DIZZINESS: 0
CHILLS: 0
HEMATOCHEZIA: 0
CONSTIPATION: 0
NAUSEA: 0
WEAKNESS: 0
MYALGIAS: 0
ARTHRALGIAS: 0
SHORTNESS OF BREATH: 0
NERVOUS/ANXIOUS: 0
SORE THROAT: 0
FREQUENCY: 0
BREAST MASS: 0
EYE PAIN: 0
HEMATURIA: 0
FEVER: 0
HEADACHES: 0
PARESTHESIAS: 0

## 2023-04-14 ASSESSMENT — PAIN SCALES - GENERAL: PAINLEVEL: NO PAIN (0)

## 2023-04-14 ASSESSMENT — ACTIVITIES OF DAILY LIVING (ADL): CURRENT_FUNCTION: NO ASSISTANCE NEEDED

## 2023-04-14 NOTE — PROGRESS NOTES
"SUBJECTIVE:   Vida is a 73 year old who presents for Preventive Visit.      2/28/2023     3:37 PM   Additional Questions   Roomed by Janel   Patient has been advised of split billing requirements and indicates understanding: Yes  Are you in the first 12 months of your Medicare coverage?  No    Healthy Habits:     In general, how would you rate your overall health?  Good    Frequency of exercise:  4-5 days/week    Duration of exercise:  30-45 minutes    Do you usually eat at least 4 servings of fruit and vegetables a day, include whole grains    & fiber and avoid regularly eating high fat or \"junk\" foods?  Yes    Taking medications regularly:  Yes    Ability to successfully perform activities of daily living:  No assistance needed    Home Safety:  No safety concerns identified    Hearing Impairment:  Need to ask people to speak up or repeat themselves    In the past 6 months, have you been bothered by leaking of urine?  No    In general, how would you rate your overall mental or emotional health?  Good      PHQ-2 Total Score: 0    Additional concerns today:  Yes    Hypothyroidism: Hands less tingly, less muscle aches, more energy since starting synthroid. Last labs 4/3/23 showed TSH 50.9 (down from 128) and FT4 0.76 (up from <0.10). We increased synthroid from 50 -> 75 mcg daily.     HLD: Lipids did improve with starting synthroid, on 4/3/23 Tchol 236, , , HDL 50. Did see Dr. Yap, started rosuvastatin 5 mg nightly. She took first dose last night, no side effects that she has noticed today.     Hearing Loss: Saw ENT, Bethany Ward, 3/9/23, note reviewed. Audiogram stable that day. She is in cue to get hearing aids. Says that some days hearing does sound a little better.     Tooth extracted yesterday, on abx for that.     Vitamin D 2000 international units BID. Vitamin D was low normal when checked last year.     Have you ever done Advance Care Planning? (For example, a Health Directive, POLST, or a " discussion with a medical provider or your loved ones about your wishes): Yes, advance care planning is on file.     Fall risk  Fallen 2 or more times in the past year?: No  Any fall with injury in the past year?: No    Cognitive Screening   1) Repeat 3 items (Leader, Season, Table)    2) Clock draw: NORMAL  3) 3 item recall: Recalls 3 objects  Results: 3 items recalled: COGNITIVE IMPAIRMENT LESS LIKELY    Mini-CogTM Copyright LENORA Rojas. Licensed by the author for use in [facility]; reprinted with permission (rafi@[facility]). All rights reserved.      Reviewed and updated as needed this visit by clinical staff   Tobacco  Allergies  Meds  Problems  Med Hx  Surg Hx  Fam Hx          Reviewed and updated as needed this visit by Provider   Tobacco  Allergies  Meds  Problems  Med Hx  Surg Hx  Fam Hx         Social History     Tobacco Use     Smoking status: Never     Smokeless tobacco: Never   Vaping Use     Vaping status: Never Used   Substance Use Topics     Alcohol use: Not on file           4/13/2023     8:00 PM   Alcohol Use   Prescreen: >3 drinks/day or >7 drinks/week? Not Applicable     Do you have a current opioid prescription? No  Do you use any other controlled substances or medications that are not prescribed by a provider? None    Current providers sharing in care for this patient include:   Patient Care Team:  Cassandra Valerio MD as PCP - General (Internal Medicine)  Arely Guaman AuD as Audiologist (Audiology)  Radha Ward NP as Nurse Practitioner  Arely Guaman AuD as Audiologist (Audiology)  Tova Alonso MD as Assigned PCP  Amilcar Yap MD as MD (Cardiovascular Disease)  Radha Ward NP as Assigned Surgical Provider    The following health maintenance items are reviewed in Epic and correct as of today:  Health Maintenance   Topic Date Due     ANNUAL REVIEW OF HM ORDERS  04/06/2023     TSH W/FREE T4 REFLEX  04/03/2024     MEDICARE ANNUAL  "WELLNESS VISIT  04/14/2024     FALL RISK ASSESSMENT  04/14/2024     MAMMO SCREENING  03/02/2025     LIPID  04/03/2028     ADVANCE CARE PLANNING  04/14/2028     DTAP/TDAP/TD IMMUNIZATION (4 - Td or Tdap) 09/07/2028     COLORECTAL CANCER SCREENING  07/02/2029     DEXA  11/15/2037     HEPATITIS C SCREENING  Completed     PHQ-2 (once per calendar year)  Completed     INFLUENZA VACCINE  Completed     Pneumococcal Vaccine: 65+ Years  Completed     ZOSTER IMMUNIZATION  Completed     COVID-19 Vaccine  Completed     IPV IMMUNIZATION  Aged Out     MENINGITIS IMMUNIZATION  Aged Out       Review of Systems   Constitutional: Negative for chills and fever.   HENT: Positive for hearing loss. Negative for congestion, ear pain and sore throat.    Eyes: Negative for pain and visual disturbance.   Respiratory: Negative for cough and shortness of breath.    Cardiovascular: Negative for chest pain, palpitations and peripheral edema.   Gastrointestinal: Negative for abdominal pain, constipation, diarrhea, heartburn, hematochezia and nausea.   Breasts:  Negative for tenderness, breast mass and discharge.   Genitourinary: Negative for dysuria, frequency, genital sores, hematuria, pelvic pain, urgency, vaginal bleeding and vaginal discharge.   Musculoskeletal: Negative for arthralgias, joint swelling and myalgias.   Skin: Positive for rash.   Neurological: Negative for dizziness, weakness, headaches and paresthesias.   Psychiatric/Behavioral: Negative for mood changes. The patient is not nervous/anxious.      OBJECTIVE:   /75 (BP Location: Right arm, Patient Position: Sitting, Cuff Size: Adult Regular)   Pulse 66   Temp 98.7  F (37.1  C) (Oral)   Ht 1.702 m (5' 7\")   Wt 80.2 kg (176 lb 11.2 oz)   SpO2 98%   BMI 27.68 kg/m   Estimated body mass index is 27.68 kg/m  as calculated from the following:    Height as of this encounter: 1.702 m (5' 7\").    Weight as of this encounter: 80.2 kg (176 lb 11.2 oz).  Physical Exam  GENERAL " APPEARANCE: healthy, alert and no distress  EYES: Eyes grossly normal to inspection, PERRL and conjunctivae and sclerae normal  HENT: ear canals and TM's normal, nose and mouth without ulcers or lesions, oropharynx clear and oral mucous membranes moist  NECK: no adenopathy, no asymmetry, masses, or scars and thyroid normal to palpation  RESP: lungs clear to auscultation - no rales, rhonchi or wheezes  CV: regular rate and rhythm, normal S1 S2, no S3 or S4, no murmur, click or rub, no peripheral edema and peripheral pulses strong  ABDOMEN: soft, nontender, no hepatosplenomegaly, no masses and bowel sounds normal  MS: no musculoskeletal defects are noted and gait is age appropriate without ataxia  SKIN: no suspicious lesions or rashes  NEURO: Normal strength and tone, sensory exam grossly normal, mentation intact and speech normal  PSYCH: mentation appears normal and affect normal/bright    Diagnostic Test Results:  Labs reviewed in Epic    ASSESSMENT / PLAN:     Problem List Items Addressed This Visit        Nervous and Auditory    Mixed hearing loss, bilateral     Following with ENT. Audiogram stable 3/2023. Is in cue to get hearing aids. Ear exam normal today.             Endocrine    Prediabetes     A1c 6.1 (1/2023). She continues healthy lifestyle.   - Repeat A1c with next set of labs         Relevant Orders    Hemoglobin A1c    Mixed hyperlipidemia     Had significant elevation in lipids in association with new hypothyroidism. Had some improvement with starting synthroid but LDL still 156 on labs 4/3/23. Saw cardiology, who recommended resuming low dose statin.   - Continue rosuvastatin 5 mg daily  - Repeat lipids and ALT/AST in 8 weeks          Acquired hypothyroidism     New 2/2023 with , FT4 <0.10. TPO negative. Started on synthroid 50 mcg daily with improvement in labs to TSH 50.9 and FT4 0.76. We then increased synthroid to 75 mcg daily on 4/3/23.   - Continue 75 mcg daily for now, repeat labs in 6-8  weeks   - No goiter or nodules on exam today, no need for US            Musculoskeletal and Integumentary    Osteopenia     DEXA 11/2022, lowest T score -1.9 in b/l femoral neck. Taking calcium and vitamin D.   - Recheck vitamin D  - Plan repeat DEXA in 2-3 years.             Other    Encounter for Medicare annual wellness exam - Primary     Doing very well today. Keeps up with dental exams, tooth extracted yesterday. No recent falls. She is up to date on all vaccines. Mammogram BIRADS 1 3/2023.   - Update labs  - Follow up annually for AWV         Relevant Orders    PRIMARY CARE FOLLOW-UP SCHEDULING    Comprehensive metabolic panel    CBC with platelets and differential   Other Visit Diagnoses     Vitamin D deficiency        Relevant Orders    Vitamin D Deficiency        Patient has been advised of split billing requirements and indicates understanding: Yes    COUNSELING:  Reviewed preventive health counseling, as reflected in patient instructions  Special attention given to:       Regular exercise       Healthy diet/nutrition      She reports that she has never smoked. She has never used smokeless tobacco.    Appropriate preventive services were discussed with this patient, including applicable screening as appropriate for cardiovascular disease, diabetes, osteopenia/osteoporosis, and glaucoma.  As appropriate for age/gender, discussed screening for colorectal cancer, prostate cancer, breast cancer, and cervical cancer. Checklist reviewing preventive services available has been given to the patient.    Reviewed patients plan of care and provided an AVS. The Basic Care Plan (routine screening as documented in Health Maintenance) for Vida meets the Care Plan requirement. This Care Plan has been established and reviewed with the Patient.      Cassandra Valerio MD  [facility]    Identified Health Risks:    I have reviewed Opioid Use Disorder and Substance Use Disorder risk factors and  made any needed referrals.       The patient was provided with written information regarding signs of hearing loss.

## 2023-04-14 NOTE — ASSESSMENT & PLAN NOTE
Had significant elevation in lipids in association with new hypothyroidism. Had some improvement with starting synthroid but LDL still 156 on labs 4/3/23. Saw cardiology, who recommended resuming low dose statin.   - Continue rosuvastatin 5 mg daily  - Repeat lipids and ALT/AST in 8 weeks

## 2023-04-14 NOTE — ASSESSMENT & PLAN NOTE
Doing very well today. Keeps up with dental exams, tooth extracted yesterday. No recent falls. She is up to date on all vaccines. Mammogram BIRADS 1 3/2023.   - Update labs  - Follow up annually for AWURVASHI

## 2023-04-14 NOTE — ASSESSMENT & PLAN NOTE
Following with ENT. Audiogram stable 3/2023. Is in cue to get hearing aids. Ear exam normal today.

## 2023-04-14 NOTE — ASSESSMENT & PLAN NOTE
New 2/2023 with , FT4 <0.10. TPO negative. Started on synthroid 50 mcg daily with improvement in labs to TSH 50.9 and FT4 0.76. We then increased synthroid to 75 mcg daily on 4/3/23.   - Continue 75 mcg daily for now, repeat labs in 6-8 weeks   - No goiter or nodules on exam today, no need for US

## 2023-04-14 NOTE — PATIENT INSTRUCTIONS
Patient Education   Personalized Prevention Plan  You are due for the preventive services outlined below.  Your care team is available to assist you in scheduling these services.  If you have already completed any of these items, please share that information with your care team to update in your medical record.  Health Maintenance Due   Topic Date Due     ANNUAL REVIEW OF HM ORDERS  04/06/2023     Annual Wellness Visit  04/06/2023       Signs of Hearing Loss  Hearing loss is a problem shared by many people. In fact, it's one of the most common health problems, particularly as people age. Most people aged 65 and older have some hearing loss. By age 80, almost everyone does. Hearing loss often occurs slowly over the years. So, you may not realize your hearing has gotten worse.   When sudden hearing loss occurs, it's important to contact your healthcare provider right away. Your provider will do a medical exam and a hearing exam as soon as possible. This is to help find the cause and type of your sudden hearing loss. Based on your diagnosis, your healthcare provider will discuss possible treatments.      Hearing much better with one ear can be a sign of hearing loss.     Have your hearing checked  Call your healthcare provider if you:     Have to strain to hear normal conversation    Have to watch other people s faces very carefully to follow what they re saying    Need to ask people to repeat what they ve said    Often misunderstand what people are saying    Turn the volume of the television or radio up so high that others complain    Feel that people are mumbling when they re talking to you    Find that the effort to hear leaves you feeling tired and irritated    Notice, when using the phone, that you hear better with one ear than the other  [employer] last reviewed this educational content on 6/1/2022 2000-2022 [employer]. All rights reserved. This information is not intended as a substitute for  professional medical care. Always follow your healthcare professional's instructions.

## 2023-04-14 NOTE — ASSESSMENT & PLAN NOTE
DEXA 11/2022, lowest T score -1.9 in b/l femoral neck. Taking calcium and vitamin D.   - Recheck vitamin D  - Plan repeat DEXA in 2-3 years.

## 2023-05-25 ENCOUNTER — LAB (OUTPATIENT)
Dept: LAB | Facility: CLINIC | Age: 73
End: 2023-05-25
Payer: COMMERCIAL

## 2023-05-25 DIAGNOSIS — E78.5 HYPERLIPIDEMIA, UNSPECIFIED HYPERLIPIDEMIA TYPE: ICD-10-CM

## 2023-05-25 DIAGNOSIS — R73.03 PREDIABETES: ICD-10-CM

## 2023-05-25 DIAGNOSIS — Z00.00 ENCOUNTER FOR MEDICARE ANNUAL WELLNESS EXAM: ICD-10-CM

## 2023-05-25 DIAGNOSIS — E03.9 ACQUIRED HYPOTHYROIDISM: ICD-10-CM

## 2023-05-25 DIAGNOSIS — E55.9 VITAMIN D DEFICIENCY: ICD-10-CM

## 2023-05-25 LAB
ALBUMIN SERPL BCG-MCNC: 4.2 G/DL (ref 3.5–5.2)
ALP SERPL-CCNC: 86 U/L (ref 35–104)
ALT SERPL W P-5'-P-CCNC: 12 U/L (ref 10–35)
ALT SERPL W P-5'-P-CCNC: 13 U/L (ref 10–35)
ANION GAP SERPL CALCULATED.3IONS-SCNC: 10 MMOL/L (ref 7–15)
AST SERPL W P-5'-P-CCNC: 15 U/L (ref 10–35)
AST SERPL W P-5'-P-CCNC: 16 U/L (ref 10–35)
BASOPHILS # BLD AUTO: 0 10E3/UL (ref 0–0.2)
BASOPHILS NFR BLD AUTO: 0 %
BILIRUB SERPL-MCNC: 0.3 MG/DL
BUN SERPL-MCNC: 23.9 MG/DL (ref 8–23)
CALCIUM SERPL-MCNC: 9 MG/DL (ref 8.8–10.2)
CHLORIDE SERPL-SCNC: 109 MMOL/L (ref 98–107)
CHOLEST SERPL-MCNC: 129 MG/DL
CREAT SERPL-MCNC: 0.78 MG/DL (ref 0.51–0.95)
DEPRECATED CALCIDIOL+CALCIFEROL SERPL-MC: 49 UG/L (ref 20–75)
DEPRECATED HCO3 PLAS-SCNC: 23 MMOL/L (ref 22–29)
EOSINOPHIL # BLD AUTO: 0.1 10E3/UL (ref 0–0.7)
EOSINOPHIL NFR BLD AUTO: 2 %
ERYTHROCYTE [DISTWIDTH] IN BLOOD BY AUTOMATED COUNT: 11.7 % (ref 10–15)
GFR SERPL CREATININE-BSD FRML MDRD: 80 ML/MIN/1.73M2
GLUCOSE SERPL-MCNC: 92 MG/DL (ref 70–99)
HBA1C MFR BLD: 5.5 % (ref 0–5.6)
HCT VFR BLD AUTO: 37.9 % (ref 35–47)
HDLC SERPL-MCNC: 44 MG/DL
HGB BLD-MCNC: 12.2 G/DL (ref 11.7–15.7)
IMM GRANULOCYTES # BLD: 0 10E3/UL
IMM GRANULOCYTES NFR BLD: 0 %
LDLC SERPL CALC-MCNC: 58 MG/DL
LYMPHOCYTES # BLD AUTO: 1 10E3/UL (ref 0.8–5.3)
LYMPHOCYTES NFR BLD AUTO: 21 %
MCH RBC QN AUTO: 31 PG (ref 26.5–33)
MCHC RBC AUTO-ENTMCNC: 32.2 G/DL (ref 31.5–36.5)
MCV RBC AUTO: 96 FL (ref 78–100)
MONOCYTES # BLD AUTO: 0.4 10E3/UL (ref 0–1.3)
MONOCYTES NFR BLD AUTO: 8 %
NEUTROPHILS # BLD AUTO: 3.2 10E3/UL (ref 1.6–8.3)
NEUTROPHILS NFR BLD AUTO: 70 %
NONHDLC SERPL-MCNC: 85 MG/DL
PLATELET # BLD AUTO: 319 10E3/UL (ref 150–450)
POTASSIUM SERPL-SCNC: 4.2 MMOL/L (ref 3.4–5.3)
PROT SERPL-MCNC: 6.8 G/DL (ref 6.4–8.3)
RBC # BLD AUTO: 3.94 10E6/UL (ref 3.8–5.2)
SODIUM SERPL-SCNC: 142 MMOL/L (ref 136–145)
T4 FREE SERPL-MCNC: 1.21 NG/DL (ref 0.9–1.7)
TRIGL SERPL-MCNC: 133 MG/DL
TSH SERPL DL<=0.005 MIU/L-ACNC: 5.6 UIU/ML (ref 0.3–4.2)
WBC # BLD AUTO: 4.6 10E3/UL (ref 4–11)

## 2023-05-25 PROCEDURE — 36415 COLL VENOUS BLD VENIPUNCTURE: CPT

## 2023-05-25 PROCEDURE — 85025 COMPLETE CBC W/AUTO DIFF WBC: CPT

## 2023-05-25 PROCEDURE — 80061 LIPID PANEL: CPT

## 2023-05-25 PROCEDURE — 84439 ASSAY OF FREE THYROXINE: CPT

## 2023-05-25 PROCEDURE — 83036 HEMOGLOBIN GLYCOSYLATED A1C: CPT

## 2023-05-25 PROCEDURE — 80053 COMPREHEN METABOLIC PANEL: CPT

## 2023-05-25 PROCEDURE — 82306 VITAMIN D 25 HYDROXY: CPT

## 2023-05-25 PROCEDURE — 84443 ASSAY THYROID STIM HORMONE: CPT

## 2023-05-29 ENCOUNTER — MYC MEDICAL ADVICE (OUTPATIENT)
Dept: INTERNAL MEDICINE | Facility: CLINIC | Age: 73
End: 2023-05-29
Payer: COMMERCIAL

## 2023-05-29 DIAGNOSIS — E03.9 ACQUIRED HYPOTHYROIDISM: Primary | ICD-10-CM

## 2023-07-25 ENCOUNTER — LAB (OUTPATIENT)
Dept: LAB | Facility: CLINIC | Age: 73
End: 2023-07-25
Payer: COMMERCIAL

## 2023-07-25 DIAGNOSIS — E03.9 ACQUIRED HYPOTHYROIDISM: ICD-10-CM

## 2023-07-25 LAB
T4 FREE SERPL-MCNC: 1.17 NG/DL (ref 0.9–1.7)
TSH SERPL DL<=0.005 MIU/L-ACNC: 6.12 UIU/ML (ref 0.3–4.2)

## 2023-07-25 PROCEDURE — 84443 ASSAY THYROID STIM HORMONE: CPT

## 2023-07-25 PROCEDURE — 36415 COLL VENOUS BLD VENIPUNCTURE: CPT

## 2023-07-25 PROCEDURE — 84439 ASSAY OF FREE THYROXINE: CPT

## 2023-07-26 ENCOUNTER — MYC MEDICAL ADVICE (OUTPATIENT)
Dept: INTERNAL MEDICINE | Facility: CLINIC | Age: 73
End: 2023-07-26
Payer: COMMERCIAL

## 2023-07-26 DIAGNOSIS — E03.9 ACQUIRED HYPOTHYROIDISM: ICD-10-CM

## 2023-07-26 RX ORDER — LEVOTHYROXINE SODIUM 88 UG/1
88 TABLET ORAL DAILY
Qty: 90 TABLET | Refills: 0 | Status: SHIPPED | OUTPATIENT
Start: 2023-07-26 | End: 2023-10-17

## 2023-07-27 ENCOUNTER — TRANSFERRED RECORDS (OUTPATIENT)
Dept: HEALTH INFORMATION MANAGEMENT | Facility: CLINIC | Age: 73
End: 2023-07-27
Payer: COMMERCIAL

## 2023-08-08 ENCOUNTER — TRANSFERRED RECORDS (OUTPATIENT)
Dept: HEALTH INFORMATION MANAGEMENT | Facility: CLINIC | Age: 73
End: 2023-08-08
Payer: COMMERCIAL

## 2023-08-28 ENCOUNTER — TRANSFERRED RECORDS (OUTPATIENT)
Dept: HEALTH INFORMATION MANAGEMENT | Facility: CLINIC | Age: 73
End: 2023-08-28
Payer: COMMERCIAL

## 2023-09-25 ENCOUNTER — LAB (OUTPATIENT)
Dept: LAB | Facility: CLINIC | Age: 73
End: 2023-09-25
Payer: COMMERCIAL

## 2023-09-25 DIAGNOSIS — E03.9 ACQUIRED HYPOTHYROIDISM: ICD-10-CM

## 2023-09-25 LAB — TSH SERPL DL<=0.005 MIU/L-ACNC: 0.53 UIU/ML (ref 0.3–4.2)

## 2023-09-25 PROCEDURE — 36415 COLL VENOUS BLD VENIPUNCTURE: CPT

## 2023-09-25 PROCEDURE — 84443 ASSAY THYROID STIM HORMONE: CPT

## 2023-09-26 DIAGNOSIS — E03.9 ACQUIRED HYPOTHYROIDISM: Primary | ICD-10-CM

## 2023-10-17 DIAGNOSIS — E03.9 ACQUIRED HYPOTHYROIDISM: ICD-10-CM

## 2023-10-17 RX ORDER — LEVOTHYROXINE SODIUM 88 UG/1
88 TABLET ORAL DAILY
Qty: 90 TABLET | Refills: 1 | Status: SHIPPED | OUTPATIENT
Start: 2023-10-17 | End: 2024-02-28

## 2023-10-17 NOTE — TELEPHONE ENCOUNTER
Prescription approved per [facility] Refill Protocol.  Cara Wilkinson, RN  [facility] Triage Nurse

## 2023-11-29 ENCOUNTER — OFFICE VISIT (OUTPATIENT)
Dept: FAMILY MEDICINE | Facility: CLINIC | Age: 73
End: 2023-11-29
Payer: COMMERCIAL

## 2023-11-29 VITALS
HEART RATE: 72 BPM | BODY MASS INDEX: 27.83 KG/M2 | DIASTOLIC BLOOD PRESSURE: 68 MMHG | TEMPERATURE: 97.6 F | OXYGEN SATURATION: 97 % | WEIGHT: 177.7 LBS | SYSTOLIC BLOOD PRESSURE: 119 MMHG | RESPIRATION RATE: 20 BRPM

## 2023-11-29 DIAGNOSIS — B34.9 VIRAL SYNDROME: Primary | ICD-10-CM

## 2023-11-29 PROCEDURE — 99213 OFFICE O/P EST LOW 20 MIN: CPT | Performed by: PHYSICIAN ASSISTANT

## 2023-11-29 NOTE — PATIENT INSTRUCTIONS
You were seen today for acute bronchitis. This is likely due to a viral illness.    Symptom management:  - Get plenty of rest  - Avoid smoking and second hand smoke  - May take tylenol or ibuprofen for fever/discomfort  - Drink plenty of non-caffeinated fluids  - Use nasal steroid spray for sinus congestion      Reasons to be seen in the emergency room:  - Develop a fever of 100.4 or higher  - Cough changes, coughing up blood, or become short of breath  - Neck stiffness  - Chest pain  - Severe headache  - Unable to tolerate eating or drinking fluids    Otherwise, if no symptom improvement after 5 days, follow-up with your primary care provider.

## 2023-11-29 NOTE — PROGRESS NOTES
"Patient presents with:  Cough: Lingering cough. Nasal congestion, \"squeak when exhale\", sore and raw. Hoarse voice. Some wheezing. No chest pain. Headaches with cough. No fever  Ear Problem: On/off Lt ear x for a while. Possibly related to cough per pt.      Clinical Decision Making:  COVID-19 screening test was negative at home.  Symptomatic care was gone over. Expected course of resolution and indication for return was gone over and questions were answered to patient/parent's satisfaction before discharge.        ICD-10-CM    1. Viral syndrome  B34.9           Patient Instructions   You were seen today for acute bronchitis. This is likely due to a viral illness.    Symptom management:  - Get plenty of rest  - Avoid smoking and second hand smoke  - May take tylenol or ibuprofen for fever/discomfort  - Drink plenty of non-caffeinated fluids  - Use nasal steroid spray for sinus congestion      Reasons to be seen in the emergency room:  - Develop a fever of 100.4 or higher  - Cough changes, coughing up blood, or become short of breath  - Neck stiffness  - Chest pain  - Severe headache  - Unable to tolerate eating or drinking fluids    Otherwise, if no symptom improvement after 5 days, follow-up with your primary care provider.        HPI:  Vida Najera is a 73 year old female who presents today for serial viral illness since October.  Patient shares that she had COVID earlier in the fall in September.  Subsequently she has had serial viral illness with cough and congestion.  Patient does use hearing aids and has left-sided ear pain.  Has not had otorrhea hearing loss or balance deficits to report.  Has had dry nonproductive cough and postnasal drainage that has persisted with her cough.  Patient has used DayQuil NyQuil and Flonase with mild relief.  Patient did do at home COVID testing 3 days ago and was returned as negative.    History obtained from chart review and the patient.    Problem List:  2023-04: Encounter " for Medicare annual wellness exam  2023-02: Mixed hyperlipidemia  2023-02: Acquired hypothyroidism  2023-02: Elevated LFTs  2023-01: Mixed hearing loss, bilateral  2023-01: Chronic rhinosinusitis  2023-01: Prediabetes  Benign Adenomatous Polyp Of The Large Intestine  Essential hypertriglyceridemia  Osteopenia  Menopause Has Occurred      Past Medical History:   Diagnosis Date    Hyperlipidemia     Hypothyroidism        Social History     Tobacco Use    Smoking status: Never    Smokeless tobacco: Never   Substance Use Topics    Alcohol use: Not on file       Review of Systems  As above in HPI otherwise negative.    Vitals:    11/29/23 1046   BP: 119/68   BP Location: Right arm   Patient Position: Sitting   Cuff Size: Adult Regular   Pulse: 72   Resp: 20   Temp: 97.6  F (36.4  C)   TempSrc: Tympanic   SpO2: 97%   Weight: 80.6 kg (177 lb 11.2 oz)       General: Patient is resting comfortably no acute distress is afebrile  HEENT: Head is normocephalic atraumatic   eyes are PERRL EOMI sclera anicteric   TMs are clear bilaterally  Throat is with mild pharyngeal wall erythema and no exudate  No cervical lymphadenopathy present  LUNGS: Clear to auscultation bilaterally breath sounds appreciated.  Normal respiratory effort and excursion.  HEART: Regular rate and rhythm  Skin: Without rash non-diaphoretic    Physical Exam    At the end of the encounter, I discussed results, diagnosis, medications. Discussed red flags for immediate return to clinic/ER, as well as indications for follow up if no improvement. Patient understood and agreed to plan. Patient was stable for discharge.

## 2023-12-14 ENCOUNTER — PATIENT OUTREACH (OUTPATIENT)
Dept: GASTROENTEROLOGY | Facility: CLINIC | Age: 73
End: 2023-12-14
Payer: COMMERCIAL

## 2023-12-19 ENCOUNTER — LAB (OUTPATIENT)
Dept: LAB | Facility: CLINIC | Age: 73
End: 2023-12-19
Payer: COMMERCIAL

## 2023-12-19 DIAGNOSIS — E03.9 ACQUIRED HYPOTHYROIDISM: ICD-10-CM

## 2023-12-19 LAB
T4 FREE SERPL-MCNC: 1.34 NG/DL (ref 0.9–1.7)
TSH SERPL DL<=0.005 MIU/L-ACNC: 0.2 UIU/ML (ref 0.3–4.2)

## 2023-12-19 PROCEDURE — 84439 ASSAY OF FREE THYROXINE: CPT

## 2023-12-19 PROCEDURE — 84443 ASSAY THYROID STIM HORMONE: CPT

## 2023-12-19 PROCEDURE — 36415 COLL VENOUS BLD VENIPUNCTURE: CPT

## 2024-01-02 ENCOUNTER — OFFICE VISIT (OUTPATIENT)
Dept: INTERNAL MEDICINE | Facility: CLINIC | Age: 74
End: 2024-01-02
Payer: COMMERCIAL

## 2024-01-02 VITALS
HEIGHT: 67 IN | BODY MASS INDEX: 28.19 KG/M2 | HEART RATE: 88 BPM | RESPIRATION RATE: 16 BRPM | WEIGHT: 179.6 LBS | TEMPERATURE: 98.2 F | DIASTOLIC BLOOD PRESSURE: 64 MMHG | SYSTOLIC BLOOD PRESSURE: 122 MMHG | OXYGEN SATURATION: 99 %

## 2024-01-02 DIAGNOSIS — R06.2 WHEEZING: Primary | ICD-10-CM

## 2024-01-02 DIAGNOSIS — E03.9 ACQUIRED HYPOTHYROIDISM: ICD-10-CM

## 2024-01-02 PROCEDURE — 99214 OFFICE O/P EST MOD 30 MIN: CPT | Performed by: INTERNAL MEDICINE

## 2024-01-02 RX ORDER — IPRATROPIUM BROMIDE 42 UG/1
2 SPRAY, METERED NASAL 4 TIMES DAILY
Qty: 15 ML | Refills: 4 | Status: SHIPPED | OUTPATIENT
Start: 2024-01-02

## 2024-01-02 RX ORDER — ALBUTEROL SULFATE 90 UG/1
2 AEROSOL, METERED RESPIRATORY (INHALATION) EVERY 6 HOURS PRN
Qty: 18 G | Refills: 0 | Status: SHIPPED | OUTPATIENT
Start: 2024-01-02 | End: 2024-04-24

## 2024-01-02 ASSESSMENT — ENCOUNTER SYMPTOMS: COUGH: 1

## 2024-01-02 NOTE — PROGRESS NOTES
"  Assessment & Plan   Problem List Items Addressed This Visit          Respiratory    Wheezing - Primary     Vida unfortunately had repeat viral illnesses this last fall, likely from grandchildren. Since then has had intermittent cough with audible wheezing. No previous h/o asthma. Lungs clear on exam today. Nares are inflamed with small clear effusion of L TM.   - Trial atrovent nasal spray for PND  - Trial albuterol inhaler when she coughs next to see if it helps  - Will get PFTs as well          Relevant Medications    albuterol (PROAIR HFA/PROVENTIL HFA/VENTOLIN HFA) 108 (90 Base) MCG/ACT inhaler    ipratropium (ATROVENT) 0.06 % nasal spray    Other Relevant Orders    General PFT Lab (Please always keep checked)       Endocrine    Acquired hypothyroidism     New 2/2023 with , FT4 <0.10. TPO negative. Started on synthroid 50 mcg daily with improvement in labs to TSH 50.9 and FT4 0.76. Since then have slowly increased dose but now perhaps over-replacing with labs 12/2023 showing TSH 0.2/FT4 1.34.  - Decrease synthroid from 88 mcg daily to 6 days a week  - Repeat labs 6-8 weeks         Relevant Orders    TSH    T4, free      Ordering of each unique test  Prescription drug management    FUTURE APPOINTMENTS:       - Follow-up for annual visit or as needed    Cassandra Joyce Valerio MD  [facility]   Vida is a 73 year old, presenting for the following health issues:  Cough (Has been having a cough for the last few months )        1/2/2024     1:26 PM   Additional Questions   Roomed by Abbi     History of Present Illness     Reason for visit:  TSH/Thyroid dosing, coughing/expiratory \"squeak\" symptoms    Hypothyroidism: We increased synthroid 75 -> 88 mcg 7/26/23 -> TSH 0.53 (9/25/23), continued same dose. Repeat 12/2023 TSH 0.2/FT4 1.34. We are going to reduce dose just slightly to 88 mcg six days a week.     Cough: Had COVID first time in August. Then 3-4 colds over " "the fall, that she attributes to illnesses from her grandchildren. Went to [facility] 11/29/23, thought to have viral URI. Since then, has had 11 coughing episodes. Not always productive, when productive clear to yellow. When that happens notices a squeaking or wheezing. However, doesn't feel she has a cold now. Blows nose a lot when this occurs. Has tried saline nasal sprays, unclear benefit. Has tried flonase in the past but ran into issues with nose bleeds.     HLD: tolerating rosuvastatin 5 mg daily     She eats 4 or more servings of fruits and vegetables daily.She consumes 0 sweetened beverage(s) daily.She exercises with enough effort to increase her heart rate 30 to 60 minutes per day.  She exercises with enough effort to increase her heart rate 5 days per week.   She is taking medications regularly.       Review of Systems   Respiratory:  Positive for cough.       Constitutional, HEENT, cardiovascular, pulmonary, gi and gu systems are negative, except as otherwise noted.      Objective    /64   Pulse 88   Temp 98.2  F (36.8  C) (Oral)   Resp 16   Ht 1.702 m (5' 7\")   Wt 81.5 kg (179 lb 9.6 oz)   SpO2 99%   BMI 28.13 kg/m    Body mass index is 28.13 kg/m .  Physical Exam   GENERAL: healthy, alert and no distress  EYES: Eyes grossly normal to inspection, PERRL and conjunctivae and sclerae normal  HENT: R ear canal and TM normal, L ear canal normal, TM with small clear effusion, nares erythematous and edematous and mouth without ulcers or lesions  NECK: no adenopathy, no asymmetry, masses, or scars and thyroid normal to palpation  RESP: lungs clear to auscultation - no rales, rhonchi or wheezes  CV: regular rate and rhythm, normal S1 S2, no S3 or S4, no murmur, click or rub, no peripheral edema and peripheral pulses strong  MS: no gross musculoskeletal defects noted, no edema  SKIN: no suspicious lesions or rashes on exposed skin   NEURO: Normal strength and tone, mentation intact and speech normal  PSYCH: " mentation appears normal, affect normal/bright

## 2024-01-02 NOTE — ASSESSMENT & PLAN NOTE
New 2/2023 with , FT4 <0.10. TPO negative. Started on synthroid 50 mcg daily with improvement in labs to TSH 50.9 and FT4 0.76. Since then have slowly increased dose but now perhaps over-replacing with labs 12/2023 showing TSH 0.2/FT4 1.34.  - Decrease synthroid from 88 mcg daily to 6 days a week  - Repeat labs 6-8 weeks

## 2024-01-02 NOTE — PATIENT INSTRUCTIONS
Update thyroid labs in 6-8 weeks. I will reach out when I see those results.     Start Atrovent nasal spray to help decrease inflammation in nose.     Try out albuterol the next time you hear wheezing, let me know if you notice improvement.     You will get a call to schedule PFTs, lung function testing.     Okay to get RSV vaccine whenever.

## 2024-01-02 NOTE — ASSESSMENT & PLAN NOTE
Vida unfortunately had repeat viral illnesses this last fall, likely from grandchildren. Since then has had intermittent cough with audible wheezing. No previous h/o asthma. Lungs clear on exam today. Nares are inflamed with small clear effusion of L TM.   - Trial atrovent nasal spray for PND  - Trial albuterol inhaler when she coughs next to see if it helps  - Will get PFTs as well

## 2024-01-04 NOTE — COMMUNITY RESOURCES LIST (ENGLISH)
01/04/2024   [facility]  N/A  For questions about this resource list or additional care needs, please contact your primary care clinic or care manager.  Phone: [phone]   Email: N/A   Address: [address] [place] [address]   Hours: N/A        Hotlines and Helplines       Hotline - Housing crisis  1  [facility] Distance: 4.76 miles      Phone/Virtual   [address] [place] [address]  Language: English  Hours: Mon - Sun Open 24 Hours   Phone: (151) 377-5512 Email: communications@[facility].org Website: https://[facility].org/oursaviourshousing/     2  [employer] Distance: 6.17 miles      Phone/Virtual   [address] [place] [address]  Language: English  Hours: Mon - Sun Open 24 Hours   Phone: (863) 312-6265 Email: info@[facility].org Website: http://www.[facility].org          Housing       Coordinated Entry access point  3  [facility] - Coordinated Access to Housing and Shelter ([facility]) - Coordinated Access - Coordinated Entry access point Distance: 3.62 miles      In-Person, Phone/Virtual   [address] [place] [address]  Language: English  Hours: Mon - Fri 8:00 AM - 4:30 PM  Fees: Free   Phone: (214) 210-2436 Website: https://www.[facility]./residents/assistance-support/assistance/housing-services-support     4  [facility] ([facility]) - Housing Services Distance: 4.97 miles      In-Person   [address] [place] [address]  Language: English  Hours: Mon - Fri 9:00 AM - 5:00 PM  Fees: Free   Phone: (859) 838-7555 Email: housing@[facility].org Website: http://www.[facility].org/housing     Drop-in center or day shelter  5  [facility] Distance: 4.54 miles      In-Person   [address] [place] [address]  Language: English, Montenegrin, Romansh  Hours: Mon - Sat 7:00 AM - 3:00 PM  Fees: Free, Self Pay   Phone: (273)  454-3114 Email: info@[facility].org Website: https://www.[facility].org/locations/opportunity-center/     6  [facility] Distance: 4.82 miles      In-Person   [address] [place] [address]  Language: English  Hours: Mon - Fri 12:00 PM - 3:00 PM  Fees: Free   Phone: (711) 886-2692 Email: [employer] Website: http://[employer]/     Housing search assistance  7  [facility] [place] - Mental Health Crisis Housing Search Assistance Distance: 3.1 miles      In-Person, Phone/Virtual   [address] [facility] [address] [place] [address]  Language: English  Hours: Mon - Tue 8:00 AM - 4:30 PM , Wed 8:00 AM - 6:00 PM , Thu - Fri 8:00 AM - 4:30 PM  Fees: Free   Phone: (292) 965-7078 Email: [facility]. Website: https://www.[facility]./residents/health-medical/clinics-services/mental-health/adult-mental-health     8  [employer] - https://[employer]/ Distance: 4.28 miles      Phone/Virtual   [address] [place] [address]  Language: English  Hours: Mon - Sun Open 24 Hours   Email: info@[employer] Website: https://[employer]     Shelter for families  9  [facility] Distance: 14.24 miles      In-Person   [address] [place] [address]  Language: English  Hours: Mon - Fri 3:00 PM - 9:00 AM , Sat - Sun Open 24 Hours  Fees: Free   Phone: (858) 429-7060 Ext.1 Website: https://www.[facility].org/2020/07/03/emergency-family-shelter/     Shelter for individuals  10  [facility] - Coordinated Access to Housing and Shelter ([facility]) - Coordinated Access - Emergency housing Distance: 3.62 miles      In-Person, Phone/Virtual   [address] [place] [address]  Language: English  Hours: Mon - Fri 8:00 AM - 4:30 PM  Fees: Free   Phone: (727) 204-4163 Website:  https://www.[facility]./residents/assistance-support/assistance/housing-services-support     11  [facility] Distance: 4.76 miles      In-Person   [address] [place] [address]  Language: English  Hours: Mon - Sun Open 24 Hours  Fees: Free   Phone: (714) 674-6785 Email: communications@[facility].org Website: https://[facility].org/oursaviourshousing/          Important Numbers & Websites       Emergency Services   [address]  [place] [address]  Poison Control   (289) 140-3977  Suicide Prevention Lifeline   (765) 249-5452 (TALK)  Child Abuse Hotline   (755) 257-4339 (4-A-Child)  Sexual Assault Hotline   (668) 272-5839 (HOPE)  National Runaway Safeline   (782) 466-8342 (RUNAWAY)  All-Options Talkline   (617) 862-9764  Substance Abuse Referral   (712) 676-8179 (HELP)

## 2024-01-23 ENCOUNTER — MYC MEDICAL ADVICE (OUTPATIENT)
Dept: INTERNAL MEDICINE | Facility: CLINIC | Age: 74
End: 2024-01-23
Payer: COMMERCIAL

## 2024-01-24 NOTE — TELEPHONE ENCOUNTER
"Please advise on levothyroxine    \"I will run out of 88 mg levothyroxine this week (have been taking them 6x/week). Are you OK if I start the 75 mg 7 days/week OR should I refill the 88 mg script? Will get labs end of Feb. \"  " Rhomboid Transposition Flap Text: The defect edges were debeveled with a #15 scalpel blade.  Given the location of the defect and the proximity to free margins a rhomboid transposition flap was deemed most appropriate.  Using a sterile surgical marker, an appropriate rhomboid flap was drawn incorporating the defect.    The area thus outlined was incised deep to adipose tissue with a #15 scalpel blade.  The skin margins were undermined to an appropriate distance in all directions utilizing iris scissors.

## 2024-02-08 ENCOUNTER — MYC MEDICAL ADVICE (OUTPATIENT)
Dept: INTERNAL MEDICINE | Facility: CLINIC | Age: 74
End: 2024-02-08
Payer: COMMERCIAL

## 2024-02-12 ENCOUNTER — OFFICE VISIT (OUTPATIENT)
Dept: PULMONOLOGY | Facility: CLINIC | Age: 74
End: 2024-02-12
Payer: COMMERCIAL

## 2024-02-12 DIAGNOSIS — R06.2 WHEEZING: ICD-10-CM

## 2024-02-12 LAB — HGB BLD-MCNC: 13.7 G/DL

## 2024-02-12 PROCEDURE — 94060 EVALUATION OF WHEEZING: CPT | Performed by: INTERNAL MEDICINE

## 2024-02-12 PROCEDURE — 85018 HEMOGLOBIN: CPT | Mod: QW | Performed by: INTERNAL MEDICINE

## 2024-02-12 PROCEDURE — 94726 PLETHYSMOGRAPHY LUNG VOLUMES: CPT | Performed by: INTERNAL MEDICINE

## 2024-02-12 PROCEDURE — 94729 DIFFUSING CAPACITY: CPT | Performed by: INTERNAL MEDICINE

## 2024-02-13 LAB
DLCOCOR-%PRED-PRE: 97 %
DLCOCOR-PRE: 19.47 ML/MIN/MMHG
DLCOUNC-%PRED-PRE: 98 %
DLCOUNC-PRE: 19.65 ML/MIN/MMHG
DLCOUNC-PRED: 19.97 ML/MIN/MMHG
ERV-%PRED-PRE: 54 %
ERV-PRE: 0.57 L
ERV-PRED: 1.05 L
EXPTIME-PRE: 8.81 SEC
FEF2575-%PRED-POST: 108 %
FEF2575-%PRED-PRE: 86 %
FEF2575-POST: 1.92 L/SEC
FEF2575-PRE: 1.52 L/SEC
FEF2575-PRED: 1.77 L/SEC
FEFMAX-%PRED-PRE: 111 %
FEFMAX-PRE: 6.45 L/SEC
FEFMAX-PRED: 5.8 L/SEC
FEV1-%PRED-PRE: 107 %
FEV1-PRE: 2.31 L
FEV1FEV6-PRE: 73 %
FEV1FEV6-PRED: 79 %
FEV1FVC-PRE: 71 %
FEV1FVC-PRED: 78 %
FEV1SVC-PRE: 71 %
FEV1SVC-PRED: 64 %
FIFMAX-PRE: 5.44 L/SEC
FRCPLETH-%PRED-PRE: 114 %
FRCPLETH-PRE: 3.28 L
FRCPLETH-PRED: 2.86 L
FVC-%PRED-PRE: 117 %
FVC-PRE: 3.24 L
FVC-PRED: 2.76 L
IC-%PRED-PRE: 127 %
IC-PRE: 2.69 L
IC-PRED: 2.11 L
RVPLETH-%PRED-PRE: 121 %
RVPLETH-PRE: 2.71 L
RVPLETH-PRED: 2.23 L
TLCPLETH-%PRED-PRE: 111 %
TLCPLETH-PRE: 5.97 L
TLCPLETH-PRED: 5.36 L
VA-%PRED-PRE: 97 %
VA-PRE: 4.78 L
VC-%PRED-PRE: 98 %
VC-PRE: 3.26 L
VC-PRED: 3.32 L

## 2024-02-17 NOTE — TELEPHONE ENCOUNTER
PFTs were normal. We should have her come in for follow up visit to discuss next steps if she is still having symptoms. Approval required is okay.

## 2024-02-20 ENCOUNTER — LAB (OUTPATIENT)
Dept: LAB | Facility: CLINIC | Age: 74
End: 2024-02-20
Payer: COMMERCIAL

## 2024-02-20 DIAGNOSIS — E03.9 ACQUIRED HYPOTHYROIDISM: ICD-10-CM

## 2024-02-20 LAB
T4 FREE SERPL-MCNC: 1.11 NG/DL (ref 0.9–1.7)
TSH SERPL DL<=0.005 MIU/L-ACNC: 1.75 UIU/ML (ref 0.3–4.2)

## 2024-02-20 PROCEDURE — 36415 COLL VENOUS BLD VENIPUNCTURE: CPT

## 2024-02-20 PROCEDURE — 84439 ASSAY OF FREE THYROXINE: CPT

## 2024-02-20 PROCEDURE — 84443 ASSAY THYROID STIM HORMONE: CPT

## 2024-02-21 ENCOUNTER — MYC MEDICAL ADVICE (OUTPATIENT)
Dept: INTERNAL MEDICINE | Facility: CLINIC | Age: 74
End: 2024-02-21
Payer: COMMERCIAL

## 2024-02-22 RX ORDER — LEVOTHYROXINE SODIUM 75 UG/1
75 TABLET ORAL DAILY
Qty: 90 TABLET | Refills: 0 | Status: CANCELLED | OUTPATIENT
Start: 2024-02-22

## 2024-02-28 ENCOUNTER — OFFICE VISIT (OUTPATIENT)
Dept: INTERNAL MEDICINE | Facility: CLINIC | Age: 74
End: 2024-02-28
Payer: COMMERCIAL

## 2024-02-28 VITALS
OXYGEN SATURATION: 99 % | RESPIRATION RATE: 14 BRPM | DIASTOLIC BLOOD PRESSURE: 62 MMHG | BODY MASS INDEX: 28.28 KG/M2 | SYSTOLIC BLOOD PRESSURE: 118 MMHG | HEART RATE: 79 BPM | WEIGHT: 180.2 LBS | HEIGHT: 67 IN | TEMPERATURE: 97.9 F

## 2024-02-28 DIAGNOSIS — J32.9 CHRONIC RHINOSINUSITIS: ICD-10-CM

## 2024-02-28 DIAGNOSIS — E03.9 ACQUIRED HYPOTHYROIDISM: Primary | ICD-10-CM

## 2024-02-28 PROCEDURE — 99213 OFFICE O/P EST LOW 20 MIN: CPT | Performed by: INTERNAL MEDICINE

## 2024-02-28 RX ORDER — LEVOTHYROXINE SODIUM 75 UG/1
75 TABLET ORAL DAILY
Qty: 90 TABLET | Refills: 1 | Status: SHIPPED | OUTPATIENT
Start: 2024-02-28 | End: 2024-04-24

## 2024-02-28 NOTE — ASSESSMENT & PLAN NOTE
Brain MRI 12/2022 did show marked paranasal sinus disease.  This winter, patient has struggled with recurrent nasal congestion, PND with associated cough and some wheezing.  PFTs were normal.  At this point, I do feel her symptoms are related to the flares of her chronic rhinosinusitis, possibly allergy related along with flaring with viral illnesses.   - Will have her start daily flonase ([facility]) and daily saline sinus rinses  - Start daily antihistamine  - Can continue PRN intranasal ipratropium but watching for dryness and nose bleeds  - If no improvement with these things, instructed to f/up with ENT

## 2024-02-28 NOTE — PATIENT INSTRUCTIONS
For rhinitis/sinusitis:   - Start daily sinus rinses ([facility])  - Start daily flonase (one spray each nostril)  - Can continue intermittent ipratropium on top but watch for drying too much or nose bleeds   - Start daily antihistamine: zyrtec, allegra or claritin   - If no improvement after 3-4 weeks, would follow up with ENT

## 2024-02-28 NOTE — PROGRESS NOTES
Assessment & Plan   Problem List Items Addressed This Visit          Respiratory    Chronic rhinosinusitis     Brain MRI 12/2022 did show marked paranasal sinus disease.  This winter, patient has struggled with recurrent nasal congestion, PND with associated cough and some wheezing.  PFTs were normal.  At this point, I do feel her symptoms are related to the flares of her chronic rhinosinusitis, possibly allergy related along with flaring with viral illnesses.   - Will have her start daily flonase ([facility]CS) and daily saline sinus rinses  - Start daily antihistamine  - Can continue PRN intranasal ipratropium but watching for dryness and nose bleeds  - If no improvement with these things, instructed to f/up with ENT            Endocrine    Acquired hypothyroidism - Primary     Recent labs improved with TSH 1.75, free T4 1.11 on Synthroid 75 mcg daily.  -Continue Synthroid 75 mcg daily, refill provided today  -Plan to repeat labs at her annual wellness visit in April         Relevant Medications    levothyroxine (SYNTHROID/LEVOTHROID) 75 MCG tablet        I spent a total of 22 minutes on the day of the visit.   Time spent by me doing chart review, history and exam, documentation and further activities per the note       FUTURE APPOINTMENTS:       - Follow-up for annual visit or as needed      Dougie Mcpherson is a 74 year old, presenting for the following health issues:  Follow Up        2/28/2024     2:49 PM   Additional Questions   Roomed by Abbi     History of Present Illness       Reason for visit:  F/u on cough, wheezing    Hypothryoidism: Recent labs improved with TSH 1.75, free T4 1.11 on Synthroid 75 mcg daily.    Wheezing / cough:  After repeated viral illnesses from grandchildren over the winter, had worsening dry cough, wheezing and PND. We did try adding nasal ipratropium at visit in January which helps some but still getting flares where all of these things worsen. Most recently started again this last  "Sunday. She is doing daily saline mist, doesn't like Netti pot. Not using any regular antihistamines. Did have MRI of her brain that noted marked left frontal and right maxillary paranasal sinus disease 12/2022. Did have her get PFTs, were normal 2/2024.     She consumes 0 sweetened beverage(s) daily. She exercises with enough effort to increase her heart rate 4 days per week.   She is taking medications regularly.         Review of Systems  Constitutional, neuro, ENT, endocrine, pulmonary, cardiac, gastrointestinal, genitourinary, musculoskeletal, integument and psychiatric systems are negative, except as otherwise noted.      Objective    /62   Pulse 79   Temp 97.9  F (36.6  C) (Oral)   Resp 14   Ht 1.702 m (5' 7\")   Wt 81.7 kg (180 lb 3.2 oz)   SpO2 99%   BMI 28.22 kg/m    Body mass index is 28.22 kg/m .  Physical Exam   GENERAL: alert and no distress  EYES: Eyes grossly normal to inspection, PERRL and conjunctivae and sclerae normal  HENT: ear canals and TM's normal, b/l nares quite erythematous and edematous with boggy inferior turbinates and mouth without ulcers or lesions  RESP: lungs clear to auscultation - no rales, rhonchi or wheezes  CV: regular rate and rhythm, normal S1 S2, no S3 or S4, no murmur, click or rub, no peripheral edema  ABDOMEN: soft, nontender  MS: no gross musculoskeletal defects noted, no edema  SKIN: no suspicious lesions or rashes  NEURO: Normal strength and tone, mentation intact and speech normal  PSYCH: mentation appears normal, affect normal/bright            Signed Electronically by: Cassandra Valerio MD    "

## 2024-02-28 NOTE — ASSESSMENT & PLAN NOTE
Recent labs improved with TSH 1.75, free T4 1.11 on Synthroid 75 mcg daily.  -Continue Synthroid 75 mcg daily, refill provided today  -Plan to repeat labs at her annual wellness visit in April

## 2024-03-05 ENCOUNTER — ANCILLARY PROCEDURE (OUTPATIENT)
Dept: MAMMOGRAPHY | Facility: HOSPITAL | Age: 74
End: 2024-03-05
Attending: INTERNAL MEDICINE
Payer: COMMERCIAL

## 2024-03-05 DIAGNOSIS — Z12.31 VISIT FOR SCREENING MAMMOGRAM: ICD-10-CM

## 2024-03-05 PROCEDURE — 77063 BREAST TOMOSYNTHESIS BI: CPT

## 2024-03-25 ENCOUNTER — PATIENT OUTREACH (OUTPATIENT)
Dept: GASTROENTEROLOGY | Facility: CLINIC | Age: 74
End: 2024-03-25
Payer: COMMERCIAL

## 2024-03-25 DIAGNOSIS — Z12.11 SPECIAL SCREENING FOR MALIGNANT NEOPLASMS, COLON: Primary | ICD-10-CM

## 2024-03-25 NOTE — PROGRESS NOTES
"Patient has a history of polyps and surveillance colonoscopy is due June 2024. Patient meets criteria for CRC high risk standing order protocol.    CRC Screening Colonoscopy Referral Review    Patient meets the inclusion criteria for screening colonoscopy standing order.    Ordering/Referring Provider:  Cassandra Valerio MD    BMI: Estimated body mass index is 28.22 kg/m  as calculated from the following:    Height as of 2/28/24: 1.702 m (5' 7\").    Weight as of 2/28/24: 81.7 kg (180 lb 3.2 oz).     Sedation:  Does patient have any of the following conditions affecting sedation?  No medical conditions affecting sedation.    Previous Scopes:  Any previous recommendations or follow up needs based on previous scope?  na / No recommendations.    Medical Concerns to Postpone Order:  Does patient have any of the following medical concerns that should postpone/delay colonoscopy referral?  No medical conditions affecting colonoscopy referral.    Final Referral Details:  Based on patient's medical history patient is appropriate for referral order with moderate sedation. If patient's BMI > 50 do not schedule in ASC.  "

## 2024-04-01 DIAGNOSIS — E78.5 HYPERLIPIDEMIA, UNSPECIFIED HYPERLIPIDEMIA TYPE: ICD-10-CM

## 2024-04-02 RX ORDER — ROSUVASTATIN CALCIUM 5 MG/1
5 TABLET, COATED ORAL DAILY
Qty: 90 TABLET | Refills: 0 | Status: SHIPPED | OUTPATIENT
Start: 2024-04-02 | End: 2024-04-24

## 2024-04-23 SDOH — HEALTH STABILITY: PHYSICAL HEALTH: ON AVERAGE, HOW MANY DAYS PER WEEK DO YOU ENGAGE IN MODERATE TO STRENUOUS EXERCISE (LIKE A BRISK WALK)?: 4 DAYS

## 2024-04-23 SDOH — HEALTH STABILITY: PHYSICAL HEALTH: ON AVERAGE, HOW MANY MINUTES DO YOU ENGAGE IN EXERCISE AT THIS LEVEL?: 30 MIN

## 2024-04-23 ASSESSMENT — SOCIAL DETERMINANTS OF HEALTH (SDOH): HOW OFTEN DO YOU GET TOGETHER WITH FRIENDS OR RELATIVES?: MORE THAN THREE TIMES A WEEK

## 2024-04-24 ENCOUNTER — OFFICE VISIT (OUTPATIENT)
Dept: INTERNAL MEDICINE | Facility: CLINIC | Age: 74
End: 2024-04-24
Attending: INTERNAL MEDICINE
Payer: COMMERCIAL

## 2024-04-24 VITALS
SYSTOLIC BLOOD PRESSURE: 118 MMHG | TEMPERATURE: 98.1 F | OXYGEN SATURATION: 96 % | DIASTOLIC BLOOD PRESSURE: 70 MMHG | HEART RATE: 74 BPM | RESPIRATION RATE: 17 BRPM | WEIGHT: 182 LBS | HEIGHT: 66 IN | BODY MASS INDEX: 29.25 KG/M2

## 2024-04-24 DIAGNOSIS — E78.2 MIXED HYPERLIPIDEMIA: ICD-10-CM

## 2024-04-24 DIAGNOSIS — J32.9 CHRONIC RHINOSINUSITIS: ICD-10-CM

## 2024-04-24 DIAGNOSIS — M94.9 DISORDER OF BONE AND CARTILAGE: ICD-10-CM

## 2024-04-24 DIAGNOSIS — R73.03 PREDIABETES: ICD-10-CM

## 2024-04-24 DIAGNOSIS — Z00.00 ENCOUNTER FOR MEDICARE ANNUAL WELLNESS EXAM: Primary | ICD-10-CM

## 2024-04-24 DIAGNOSIS — M89.9 DISORDER OF BONE AND CARTILAGE: ICD-10-CM

## 2024-04-24 DIAGNOSIS — H90.6 MIXED HEARING LOSS, BILATERAL: ICD-10-CM

## 2024-04-24 DIAGNOSIS — E03.9 ACQUIRED HYPOTHYROIDISM: ICD-10-CM

## 2024-04-24 LAB
ALBUMIN SERPL BCG-MCNC: 4.3 G/DL (ref 3.5–5.2)
ALP SERPL-CCNC: 99 U/L (ref 40–150)
ALT SERPL W P-5'-P-CCNC: 16 U/L (ref 0–50)
ANION GAP SERPL CALCULATED.3IONS-SCNC: 7 MMOL/L (ref 7–15)
AST SERPL W P-5'-P-CCNC: 17 U/L (ref 0–45)
BILIRUB SERPL-MCNC: 0.4 MG/DL
BUN SERPL-MCNC: 19.7 MG/DL (ref 8–23)
CALCIUM SERPL-MCNC: 9.6 MG/DL (ref 8.8–10.2)
CHLORIDE SERPL-SCNC: 107 MMOL/L (ref 98–107)
CHOLEST SERPL-MCNC: 153 MG/DL
CREAT SERPL-MCNC: 0.8 MG/DL (ref 0.51–0.95)
DEPRECATED HCO3 PLAS-SCNC: 25 MMOL/L (ref 22–29)
EGFRCR SERPLBLD CKD-EPI 2021: 77 ML/MIN/1.73M2
ERYTHROCYTE [DISTWIDTH] IN BLOOD BY AUTOMATED COUNT: 13 % (ref 10–15)
FASTING STATUS PATIENT QL REPORTED: YES
GLUCOSE SERPL-MCNC: 101 MG/DL (ref 70–99)
HBA1C MFR BLD: 5.9 % (ref 0–5.6)
HCT VFR BLD AUTO: 41.3 % (ref 35–47)
HDLC SERPL-MCNC: 51 MG/DL
HGB BLD-MCNC: 13.6 G/DL (ref 11.7–15.7)
LDLC SERPL CALC-MCNC: 72 MG/DL
MCH RBC QN AUTO: 29.4 PG (ref 26.5–33)
MCHC RBC AUTO-ENTMCNC: 32.9 G/DL (ref 31.5–36.5)
MCV RBC AUTO: 89 FL (ref 78–100)
NONHDLC SERPL-MCNC: 102 MG/DL
PLATELET # BLD AUTO: 274 10E3/UL (ref 150–450)
POTASSIUM SERPL-SCNC: 4.7 MMOL/L (ref 3.4–5.3)
PROT SERPL-MCNC: 7.2 G/DL (ref 6.4–8.3)
RBC # BLD AUTO: 4.63 10E6/UL (ref 3.8–5.2)
SODIUM SERPL-SCNC: 139 MMOL/L (ref 135–145)
TRIGL SERPL-MCNC: 150 MG/DL
TSH SERPL DL<=0.005 MIU/L-ACNC: 2.05 UIU/ML (ref 0.3–4.2)
VIT D+METAB SERPL-MCNC: 44 NG/ML (ref 20–50)
WBC # BLD AUTO: 5.6 10E3/UL (ref 4–11)

## 2024-04-24 PROCEDURE — 36415 COLL VENOUS BLD VENIPUNCTURE: CPT | Performed by: INTERNAL MEDICINE

## 2024-04-24 PROCEDURE — 80061 LIPID PANEL: CPT | Performed by: INTERNAL MEDICINE

## 2024-04-24 PROCEDURE — 99214 OFFICE O/P EST MOD 30 MIN: CPT | Mod: 25 | Performed by: INTERNAL MEDICINE

## 2024-04-24 PROCEDURE — 82306 VITAMIN D 25 HYDROXY: CPT | Performed by: INTERNAL MEDICINE

## 2024-04-24 PROCEDURE — 84443 ASSAY THYROID STIM HORMONE: CPT | Performed by: INTERNAL MEDICINE

## 2024-04-24 PROCEDURE — 80053 COMPREHEN METABOLIC PANEL: CPT | Performed by: INTERNAL MEDICINE

## 2024-04-24 PROCEDURE — 85027 COMPLETE CBC AUTOMATED: CPT | Performed by: INTERNAL MEDICINE

## 2024-04-24 PROCEDURE — G0439 PPPS, SUBSEQ VISIT: HCPCS | Performed by: INTERNAL MEDICINE

## 2024-04-24 PROCEDURE — 83036 HEMOGLOBIN GLYCOSYLATED A1C: CPT | Performed by: INTERNAL MEDICINE

## 2024-04-24 RX ORDER — LEVOTHYROXINE SODIUM 75 UG/1
75 TABLET ORAL DAILY
Qty: 90 TABLET | Refills: 3 | Status: SHIPPED | OUTPATIENT
Start: 2024-04-24

## 2024-04-24 RX ORDER — ROSUVASTATIN CALCIUM 5 MG/1
5 TABLET, COATED ORAL DAILY
Qty: 90 TABLET | Refills: 1 | Status: SHIPPED | OUTPATIENT
Start: 2024-04-24

## 2024-04-24 ASSESSMENT — PAIN SCALES - GENERAL: PAINLEVEL: NO PAIN (0)

## 2024-04-24 NOTE — ASSESSMENT & PLAN NOTE
We discussed healthy lifestyle, nutrition, cardiovascular risk reduction, self care, safety, sunscreen, and timing of cancer screening.  Health maintenance screening and immunizations reviewed with the patient.  - Having some vaginal dryness, discussed vaginal moisturizers to try  - Gets annual dermatology checks   - Will consider updating COVID vaccine at pharmacy   - BIRADS1 3/5/24.   - Colonoscopy scheduled in June.   - Follow up yearly for the annual physical.

## 2024-04-24 NOTE — ASSESSMENT & PLAN NOTE
DEXA 11/2022, lowest T score -1.9 in b/l femoral neck. Taking vitamin D3 and gets plenty of calcium in diet.   - Recheck vitamin D  - Plan repeat DEXA in [id number]/[id number]   - Continue great work on weight bearing exercise

## 2024-04-24 NOTE — PROGRESS NOTES
Preventive Care Visit  [facility] Joyce Valerio MD, Internal Medicine  Apr 24, 2024      Assessment & Plan   Problem List Items Addressed This Visit          Nervous and Auditory    Mixed hearing loss, bilateral     Following with ENT. Audiogram stable 3/2023. Has gotten hearing aids, working well. Ear exam normal today.             Respiratory    Chronic rhinosinusitis     Brain MRI 12/2022 did show marked paranasal sinus disease.  PFTs were normal. Started daily antihistamine last time and has made a marked improvement, so at this point symptoms seem most consistent with chronic rhinosinusitis, likely allergy related.  - Continue daily antihistamine  - Can continue PRN intranasal ipratropium as well             Endocrine    Prediabetes     A1c 6.1 (1/2023). She continues healthy lifestyle.   - Repeat A1c today          Relevant Orders    Hemoglobin A1c    Mixed hyperlipidemia     Much improved on low-dose statin 5/25/23 Tchol 129, , HDL 44, LDL 58  - Continue crestor 5 mg daily  - Repeat lipids today          Relevant Medications    rosuvastatin (CRESTOR) 5 MG tablet    Other Relevant Orders    Lipid panel reflex to direct LDL Fasting    Acquired hypothyroidism     Current regimen is synthroid 75 mcg daily. Clinically euthyroid today.   - Will update TSH today, adjust dose as needed based on results         Relevant Medications    levothyroxine (SYNTHROID/LEVOTHROID) 75 MCG tablet    Other Relevant Orders    TSH with free T4 reflex       Musculoskeletal and Integumentary    Osteopenia     DEXA 11/2022, lowest T score -1.9 in b/l femoral neck. Taking vitamin D3 and gets plenty of calcium in diet.   - Recheck vitamin D  - Plan repeat DEXA in [id number]   - Continue great work on weight bearing exercise         Relevant Orders    Vitamin D Deficiency       Other    Encounter for Medicare annual wellness exam - Primary     We discussed healthy lifestyle, nutrition,  "cardiovascular risk reduction, self care, safety, sunscreen, and timing of cancer screening.  Health maintenance screening and immunizations reviewed with the patient.  - Having some vaginal dryness, discussed vaginal moisturizers to try  - Gets annual dermatology checks   - Will consider updating COVID vaccine at pharmacy   - BIRADS1 3/5/24.   - Colonoscopy scheduled in June.   - Follow up yearly for the annual physical.         Relevant Orders    Comprehensive metabolic panel    CBC with platelets        Patient has been advised of split billing requirements and indicates understanding: Yes    BMI  Estimated body mass index is 29.11 kg/m  as calculated from the following:    Height as of this encounter: 1.684 m (5' 6.3\").    Weight as of this encounter: 82.6 kg (182 lb).   Weight management plan: Discussed healthy diet and exercise guidelines    Counseling  Appropriate preventive services were discussed with this patient, including applicable screening as appropriate for fall prevention, nutrition, physical activity, Tobacco-use cessation, weight loss and cognition.  Checklist reviewing preventive services available has been given to the patient.  Reviewed patient's diet, addressing concerns and/or questions.   She is at risk for psychosocial distress and has been provided with information to reduce risk.     FUTURE APPOINTMENTS:       - Follow-up for annual visit or as needed      Subjective   Vida is a 74 year old, presenting for the following:  Physical        4/24/2024     7:50 AM   Additional Questions   Roomed by De CEVALLOS CMA       Health Care Directive  Patient does not have a Health Care Directive or Living Will: Patient states has Advance Directive and will bring in a copy to clinic.    HPI    Vida is here for AWV today. Doing well, we reviewed history below.     Hypothyroidism: TSH 1.75, FT4 1.11 (2/20/24). Current regimen is synthroid 75 mcg daily.     Chronic sinusitis: Zyrtec made a significant " difference. Not using nasal sprays as much anymore.     HLD: Rosuvastatin 5 mg daily. Lipids 5/25/23 Tchol 129, , HDL 44, LDL 58  - Has cardiology f/up scheduled 8/12/24    Osteopenia: Last DEXA 11/2022 showed osteopenia with lowest T-score of -1.9 in the bilateral femoral necks. Walks or does strider most days, goal is 2 days of strength training a week as well.     Normal derm exam 8/14/23.    HCM: BIRADS1 3/5/24. Colonoscopy scheduled in June.         4/23/2024   General Health   How would you rate your overall physical health? Good   Feel stress (tense, anxious, or unable to sleep) Only a little         4/23/2024   Nutrition   Diet: Regular (no restrictions)         4/23/2024   Exercise   Days per week of moderate/strenous exercise 4 days   Average minutes spent exercising at this level 30 min         4/23/2024   Social Factors   Frequency of gathering with friends or relatives More than three times a week   Worry food won't last until get money to buy more No   Food not last or not have enough money for food? No   Do you have housing?  Yes   Are you worried about losing your housing? No   Lack of transportation? No   Unable to get utilities (heat,electricity)? No         4/24/2024   Fall Risk   Fallen 2 or more times in the past year? Yes   Trouble with walking or balance? No   Gait Speed Test (Document in seconds) 4.06   Gait Speed Test Interpretation Less than or equal to 5.00 seconds - PASS          4/23/2024   Activities of Daily Living- Home Safety   Needs help with the following daily activites None of the above   Safety concerns in the home None of the above         4/23/2024   Dental   Dentist two times every year? Yes         4/23/2024   Hearing Screening   Hearing concerns? None of the above         4/23/2024   Driving Risk Screening   Patient/family members have concerns about driving No         4/23/2024   General Alertness/Fatigue Screening   Have you been more tired than usual lately? No          4/23/2024   Urinary Incontinence Screening   Bothered by leaking urine in past 6 months No         4/23/2024   TB Screening   Were you born outside of the [place]? No     Today's PHQ-2 Score:       4/23/2024     8:07 AM   PHQ-2 ( 1999 Pfizer)   Q1: Little interest or pleasure in doing things 0   Q2: Feeling down, depressed or hopeless 0   PHQ-2 Score 0   Q1: Little interest or pleasure in doing things Not at all   Q2: Feeling down, depressed or hopeless Not at all   PHQ-2 Score 0         4/23/2024   Substance Use   Alcohol more than 3/day or more than 7/wk Not Applicable   Do you have a current opioid prescription? No   How severe/bad is pain from 1 to 10? 0/10 (No Pain)   Do you use any other substances recreationally? No     Social History     Tobacco Use    Smoking status: Never    Smokeless tobacco: Never   Vaping Use    Vaping status: Never Used   Substance Use Topics    Alcohol use: Never    Drug use: Never           3/5/2024   LAST FHS-7 RESULTS   1st degree relative breast or ovarian cancer No   Any relative bilateral breast cancer No   Any male have breast cancer No   Any ONE woman have BOTH breast AND ovarian cancer No   Any woman with breast cancer before 50yrs No   2 or more relatives with breast AND/OR ovarian cancer No   2 or more relatives with breast AND/OR bowel cancer No     Mammogram Screening - Mammogram every 1-2 years updated in Health Maintenance based on mutual decision making    ASCVD Risk   The ASCVD Risk score (Cristofer FAITH, et al., 2019) failed to calculate for the following reasons:    The valid total cholesterol range is 130 to 320 mg/dL    Reviewed and updated as needed this visit by Provider   Tobacco  Allergies  Meds  Problems  Med Hx  Surg Hx  Fam Hx     Sexual Activity          Past Medical History:   Diagnosis Date    Hyperlipidemia     Hypothyroidism      Past Surgical History:   Procedure Laterality Date    BIOPSY BREAST Right 01/01/2002    EYE SURGERY   IOL-Glaucoma 2020    GYN SURGERY  2005    TAHBSO    HYSTERECTOMY  01/01/2005    OOPHORECTOMY Bilateral 01/01/2005     Current providers sharing in care for this patient include:  Patient Care Team:  Cassandra Valerio MD as PCP - General (Internal Medicine)  Arely Guaman AuD as Audiologist (Audiology)  Radha Ward NP as Nurse Practitioner  Arely Guaman AuD as Audiologist (Audiology)  Amilcar Yap MD as MD (Cardiovascular Disease)  Radha Ward NP as Assigned Surgical Provider  Amilcar Yap MD as Assigned Heart and Vascular Provider  Cassandra Valerio MD as Assigned PCP    The following health maintenance items are reviewed in Epic and correct as of today:  Health Maintenance   Topic Date Due    RSV VACCINE (Pregnancy & 60+) (1 - 1-dose 60+ series) Never done    ANNUAL REVIEW OF HM ORDERS  04/06/2023    COVID-19 Vaccine (7 - 2023-24 season) 03/11/2024    LIPID  05/25/2024    COLORECTAL CANCER SCREENING  06/25/2024    TSH W/FREE T4 REFLEX  02/20/2025    MEDICARE ANNUAL WELLNESS VISIT  04/24/2025    FALL RISK ASSESSMENT  04/24/2025    MAMMO SCREENING  03/05/2026    GLUCOSE  05/25/2026    ADVANCE CARE PLANNING  04/14/2028    DTAP/TDAP/TD IMMUNIZATION (4 - Td or Tdap) 09/07/2028    DEXA  11/15/2037    HEPATITIS C SCREENING  Completed    PHQ-2 (once per calendar year)  Completed    INFLUENZA VACCINE  Completed    Pneumococcal Vaccine: 65+ Years  Completed    ZOSTER IMMUNIZATION  Completed    IPV IMMUNIZATION  Aged Out    HPV IMMUNIZATION  Aged Out    MENINGITIS IMMUNIZATION  Aged Out    RSV MONOCLONAL ANTIBODY  Aged Out         Review of Systems  Constitutional, neuro, ENT, endocrine, pulmonary, cardiac, gastrointestinal, genitourinary, musculoskeletal, integument and psychiatric systems are negative, except as otherwise noted.     Objective    Exam  /70 (BP Location: Right arm, Patient Position: Sitting, Cuff Size: Adult Regular)   Pulse 74   Temp 98.1  F (36.7  " C) (Oral)   Resp 17   Ht 1.684 m (5' 6.3\")   Wt 82.6 kg (182 lb)   LMP  (LMP Unknown)   SpO2 96%   Breastfeeding No   BMI 29.11 kg/m     Estimated body mass index is 29.11 kg/m  as calculated from the following:    Height as of this encounter: 1.684 m (5' 6.3\").    Weight as of this encounter: 82.6 kg (182 lb).    Physical Exam  GENERAL: alert and no distress  EYES: Eyes grossly normal to inspection, PERRL and conjunctivae and sclerae normal  HENT: ear canals and TM's normal, nose and mouth without ulcers or lesions  NECK: no adenopathy, no asymmetry, masses, or scars  RESP: lungs clear to auscultation - no rales, rhonchi or wheezes  CV: regular rate and rhythm, normal S1 S2, no S3 or S4, no murmur, click or rub, no peripheral edema  ABDOMEN: soft, nontender, no hepatosplenomegaly, no masses and bowel sounds normal  MS: no gross musculoskeletal defects noted, no edema  SKIN: no suspicious lesions or rashes on exposed skin   NEURO: Normal strength and tone, mentation intact and speech normal  PSYCH: mentation appears normal, affect normal/bright        4/24/2024   Mini Cog   Clock Draw Score 2 Normal   3 Item Recall 3 objects recalled   Mini Cog Total Score 5            Signed Electronically by: Cassandra Valerio MD  "

## 2024-04-24 NOTE — ASSESSMENT & PLAN NOTE
Current regimen is synthroid 75 mcg daily. Clinically euthyroid today.   - Will update TSH today, adjust dose as needed based on results

## 2024-04-24 NOTE — ASSESSMENT & PLAN NOTE
Following with ENT. Audiogram stable 3/2023. Has gotten hearing aids, working well. Ear exam normal today.

## 2024-04-24 NOTE — ASSESSMENT & PLAN NOTE
Brain MRI 12/2022 did show marked paranasal sinus disease.  PFTs were normal. Started daily antihistamine last time and has made a marked improvement, so at this point symptoms seem most consistent with chronic rhinosinusitis, likely allergy related.  - Continue daily antihistamine  - Can continue PRN intranasal ipratropium as well

## 2024-04-24 NOTE — ASSESSMENT & PLAN NOTE
Much improved on low-dose statin 5/25/23 Tchol 129, , HDL 44, LDL 58  - Continue crestor 5 mg daily  - Repeat lipids today

## 2024-04-24 NOTE — PATIENT INSTRUCTIONS
Goal for extra calcium 2007-2325 mg daily.     Vaginal moisturizers: Replens, Vagisil Moisturizer, Feminease, Moist Again, K-Y Liquibeads, Hyalo GYN, Revaree suppositories    Preventive Care Advice   This is general advice given by our system to help you stay healthy. However, your care team may have specific advice just for you. Please talk to your care team about your preventive care needs.  Nutrition  Eat 5 or more servings of fruits and vegetables each day.  Try wheat bread, brown rice and whole grain pasta (instead of white bread, rice, and pasta).  Get enough calcium and vitamin D. Check the label on foods and aim for 100% of the RDA (recommended daily allowance).  Lifestyle  Exercise at least 150 minutes each week   (30 minutes a day, 5 days a week).  Do muscle strengthening activities 2 days a week. These help control your weight and prevent disease.  No smoking.  Wear sunscreen to prevent skin cancer.  Have a dental exam and cleaning every 6 months.  Yearly exams  See your health care team every year to talk about:  Any changes in your health.  Any medicines your care team has prescribed.  Preventive care, family planning, and ways to prevent chronic diseases.  Shots (vaccines)   HPV shots (up to age 26), if you've never had them before.  Hepatitis B shots (up to age 59), if you've never had them before.  COVID-19 shot: Get this shot when it's due.  Flu shot: Get a flu shot every year.  Tetanus shot: Get a tetanus shot every 10 years.  Pneumococcal, hepatitis A, and RSV shots: Ask your care team if you need these based on your risk.  Shingles shot (for age 50 and up).  General health tests  Diabetes screening:  Starting at age 35, Get screened for diabetes at least every 3 years.  If you are younger than age 35, ask your care team if you should be screened for diabetes.  Cholesterol test: At age 39, start having a cholesterol test every 5 years, or more often if advised.  Bone density scan (DEXA): At age  50, ask your care team if you should have this scan for osteoporosis (brittle bones).  Hepatitis C: Get tested at least once in your life.  STIs (sexually transmitted infections)  Before age 24: Ask your care team if you should be screened for STIs.  After age 24: Get screened for STIs if you're at risk. You are at risk for STIs (including HIV) if:  You are sexually active with more than one person.  You don't use condoms every time.  You or a partner was diagnosed with a sexually transmitted infection.  If you are at risk for HIV, ask about PrEP medicine to prevent HIV.  Get tested for HIV at least once in your life, whether you are at risk for HIV or not.  Cancer screening tests  Cervical cancer screening: If you have a cervix, begin getting regular cervical cancer screening tests at age 21. Most people who have regular screenings with normal results can stop after age 65. Talk about this with your provider.  Breast cancer scan (mammogram): If you've ever had breasts, begin having regular mammograms starting at age 40. This is a scan to check for breast cancer.  Colon cancer screening: It is important to start screening for colon cancer at age 45.  Have a colonoscopy test every 10 years (or more often if you're at risk) Or, ask your provider about stool tests like a FIT test every year or Cologuard test every 3 years.  To learn more about your testing options, visit: https://www.Fanzter/402793.pdf.  For help making a decision, visit: https://bit.ly/sl88888.  Prostate cancer screening test: If you have a prostate and are age 55 to 69, ask your provider if you would benefit from a yearly prostate cancer screening test.  Lung cancer screening: If you are a current or former smoker age 50 to 80, ask your care team if ongoing lung cancer screenings are right for you.  For informational purposes only. Not to replace the advice of your health care provider. Copyright   2023 [email or web address]. All rights  reserved. Clinically reviewed by the [facility] Transitions Program. [employer] 609059 - REV 01/24.    Preventing Falls: Care Instructions  Injuries and health problems such as trouble walking or poor eyesight can increase your risk of falling. So can some medicines. But there are things you can do to help prevent falls. You can exercise to get stronger. You can also arrange your home to make it safer.    Talk to your doctor about the medicines you take. Ask if any of them increase the risk of falls and whether they can be changed or stopped.   Try to exercise regularly. It can help improve your strength and balance. This can help lower your risk of falling.     Practice fall safety and prevention.    Wear low-heeled shoes that fit well and give your feet good support. Talk to your doctor if you have foot problems that make this hard.  Carry a cellphone or wear a medical alert device that you can use to call for help.  Use stepladders instead of chairs to reach high objects. Don't climb if you're at risk for falls. Ask for help, if needed.  Wear the correct eyeglasses, if you need them.    Make your home safer.    Remove rugs, cords, clutter, and furniture from walkways.  Keep your house well lit. Use night-lights in hallways and bathrooms.  Install and use sturdy handrails on stairways.  Wear nonskid footwear, even inside. Don't walk barefoot or in socks without shoes.    Be safe outside.    Use handrails, curb cuts, and ramps whenever possible.  Keep your hands free by using a shoulder bag or backpack.  Try to walk in well-lit areas. Watch out for uneven ground, changes in pavement, and debris.  Be careful in the winter. Walk on the grass or gravel when sidewalks are slippery. Use de-icer on steps and walkways. Add non-slip devices to shoes.    Put grab bars and nonskid mats in your shower or tub and near the toilet. Try to use a shower chair or bath bench when bathing.   Get into a tub or shower by putting  "in your weaker leg first. Get out with your strong side first. Have a phone or medical alert device in the bathroom with you.   Where can you learn more?  Go to https://www.JFrog.net/patiented  Enter G117 in the search box to learn more about \"Preventing Falls: Care Instructions.\"  Current as of: July 17, 2023               Content Version: 14.0    5426-1044 [employer].   Care instructions adapted under license by your healthcare professional. If you have questions about a medical condition or this instruction, always ask your healthcare professional. [employer] disclaims any warranty or liability for your use of this information.      Learning About Stress  What is stress?     Stress is your body's response to a hard situation. Your body can have a physical, emotional, or mental response. Stress is a fact of life for most people, and it affects everyone differently. What causes stress for you may not be stressful for someone else.  A lot of things can cause stress. You may feel stress when you go on a job interview, take a test, or run a race. This kind of short-term stress is normal and even useful. It can help you if you need to work hard or react quickly. For example, stress can help you finish an important job on time.  Long-term stress is caused by ongoing stressful situations or events. Examples of long-term stress include long-term health problems, ongoing problems at work, or conflicts in your family. Long-term stress can harm your health.  How does stress affect your health?  When you are stressed, your body responds as though you are in danger. It makes hormones that speed up your heart, make you breathe faster, and give you a burst of energy. This is called the fight-or-flight stress response. If the stress is over quickly, your body goes back to normal and no harm is done.  But if stress happens too often or lasts too long, it can have bad effects. Long-term stress can " make you more likely to get sick, and it can make symptoms of some diseases worse. If you tense up when you are stressed, you may develop neck, shoulder, or low back pain. Stress is linked to high blood pressure and heart disease.  Stress also harms your emotional health. It can make you delatorre, tense, or depressed. Your relationships may suffer, and you may not do well at work or school.  What can you do to manage stress?  You can try these things to help manage stress:   Do something active. Exercise or activity can help reduce stress. Walking is a great way to get started. Even everyday activities such as housecleaning or yard work can help.  Try yoga or dedrick chi. These techniques combine exercise and meditation. You may need some training at first to learn them.  Do something you enjoy. For example, listen to music or go to a movie. Practice your hobby or do volunteer work.  Meditate. This can help you relax, because you are not worrying about what happened before or what may happen in the future.  Do guided imagery. Imagine yourself in any setting that helps you feel calm. You can use online videos, books, or a teacher to guide you.  Do breathing exercises. For example:  From a standing position, bend forward from the waist with your knees slightly bent. Let your arms dangle close to the floor.  Breathe in slowly and deeply as you return to a standing position. Roll up slowly and lift your head last.  Hold your breath for just a few seconds in the standing position.  Breathe out slowly and bend forward from the waist.  Let your feelings out. Talk, laugh, cry, and express anger when you need to. Talking with supportive friends or family, a counselor, or a ayesha leader about your feelings is a healthy way to relieve stress. Avoid discussing your feelings with people who make you feel worse.  Write. It may help to write about things that are bothering you. This helps you find out how much stress you feel and what is  "causing it. When you know this, you can find better ways to cope.  What can you do to prevent stress?  You might try some of these things to help prevent stress:  Manage your time. This helps you find time to do the things you want and need to do.  Get enough sleep. Your body recovers from the stresses of the day while you are sleeping.  Get support. Your family, friends, and community can make a difference in how you experience stress.  Limit your news feed. Avoid or limit time on social media or news that may make you feel stressed.  Do something active. Exercise or activity can help reduce stress. Walking is a great way to get started.  Where can you learn more?  Go to https://www.Vertro.net/patiented  Enter N032 in the search box to learn more about \"Learning About Stress.\"  Current as of: October 24, 2023               Content Version: 14.0    9508-0088 [employer].   Care instructions adapted under license by your healthcare professional. If you have questions about a medical condition or this instruction, always ask your healthcare professional. [employer] disclaims any warranty or liability for your use of this information.      "

## 2024-04-25 ENCOUNTER — MYC MEDICAL ADVICE (OUTPATIENT)
Dept: INTERNAL MEDICINE | Facility: CLINIC | Age: 74
End: 2024-04-25
Payer: COMMERCIAL

## 2024-06-13 ENCOUNTER — TRANSFERRED RECORDS (OUTPATIENT)
Dept: HEALTH INFORMATION MANAGEMENT | Facility: CLINIC | Age: 74
End: 2024-06-13
Payer: COMMERCIAL

## 2024-10-08 ENCOUNTER — OFFICE VISIT (OUTPATIENT)
Dept: CARDIOLOGY | Facility: CLINIC | Age: 74
End: 2024-10-08
Payer: COMMERCIAL

## 2024-10-08 VITALS
DIASTOLIC BLOOD PRESSURE: 62 MMHG | RESPIRATION RATE: 12 BRPM | BODY MASS INDEX: 28.61 KG/M2 | HEIGHT: 67 IN | HEART RATE: 80 BPM | SYSTOLIC BLOOD PRESSURE: 123 MMHG | WEIGHT: 182.3 LBS

## 2024-10-08 DIAGNOSIS — Z82.49 FAMILY HISTORY OF PREMATURE CAD: ICD-10-CM

## 2024-10-08 DIAGNOSIS — E78.5 HYPERLIPIDEMIA, UNSPECIFIED HYPERLIPIDEMIA TYPE: Primary | ICD-10-CM

## 2024-10-08 DIAGNOSIS — I25.10 CORONARY ARTERY CALCIFICATION SEEN ON CT SCAN: ICD-10-CM

## 2024-10-08 PROCEDURE — 99214 OFFICE O/P EST MOD 30 MIN: CPT | Performed by: GENERAL ACUTE CARE HOSPITAL

## 2024-10-08 PROCEDURE — G2211 COMPLEX E/M VISIT ADD ON: HCPCS | Performed by: GENERAL ACUTE CARE HOSPITAL

## 2024-10-08 RX ORDER — ROSUVASTATIN CALCIUM 5 MG/1
5 TABLET, COATED ORAL DAILY
Qty: 90 TABLET | Refills: 3 | Status: SHIPPED | OUTPATIENT
Start: 2024-10-08

## 2024-10-08 RX ORDER — CETIRIZINE HYDROCHLORIDE 5 MG/1
5 TABLET ORAL DAILY
COMMUNITY
Start: 2024-03-01

## 2024-10-08 RX ORDER — NETARSUDIL 0.2 MG/ML
1 SOLUTION/ DROPS OPHTHALMIC; TOPICAL AT BEDTIME
COMMUNITY
Start: 2024-07-15

## 2024-10-08 NOTE — PATIENT INSTRUCTIONS
Continue on the rosuvastatin.  If bleeding becomes more an issue, okay to stop aspirin or hold it temporarily.  If you have new symptoms, we can do a stress test.  See me back in 2 years.

## 2024-10-08 NOTE — PROGRESS NOTES
HEART CARE ENCOUNTER NOTE        Assessment/Recommendations   Assessment:    Hyperlipidemia. Last LDL 72 mg/dL.  Coronary artery calcification with Agatston score of 124.5 noted 9/23/2022. She denies anginal symptoms.  Family history of premature coronary artery disease.  Acquired hypothyroidism.  Body mass index is 28.55 kg/m .    Plan:  Continue rosuvastatin 5 mg daily.  Continue aspirin 81 mg daily but if she has recurrent bleeding issues, it would be okay to stop this.  Recommended regular exercise and eating a Mediterranean diet.  Follow-up with me in 2 years       The longitudinal plan of care for the diagnosis(es)/condition(s) as documented were addressed during this visit. Due to the added complexity in care, I will continue to support Vida in the subsequent management and with ongoing continuity of care.    History of Present Illness   Ms. Vida Najera is a 74 year old female with a significant past history of HLD, recently diagnosed hypothyroidism, and a family history of premature CAD presenting for follow-up.    She exercises regularly and feels well with this. No issues on rosuvastatin. She does periodically get nosebleeds and stays on aspirin.    No chest pain/pressure/tightness, shortness of breath at rest or with exertion, light headedness/dizziness, pre-syncope, syncope, lower extremity swelling, palpitations, paroxysmal nocturnal dyspnea (PND), or orthopnea. Her father had an MI at age 32 and multiple subsequent MI before dying in his 50s. Her brother had stents and a pacemaker.     She started rosuvastatin 20 mg daily 11/4/2022 for a longstanding history of hyperlipidemia and a coronary artery calcium score of 124.5 noted on CT 9/23/2022. She seemed to be doing okay with this but then had elevated LFTs noted on follow-up labs. Around this time, she also felt muscle aches. Rosuvastatin was stopped. Interestingly her cholesterol numbers seemed to be worsening. Then on 2/21/2023 she was found to  "have a TSH of 128 and subsequently started thyroid replacement.      Cardiac Problems and Cardiac Diagnostics     Most Recent Cardiac testing:  Coronary CT calcium score 9/23/2022 (report reviewed):  Total Agatston score of 124.5     Medications  Allergies   Current Outpatient Medications   Medication Sig Dispense Refill    aspirin (ASA) 81 MG EC tablet Take 1 tablet (81 mg) by mouth daily 100 tablet 3    cetirizine (ZYRTEC) 5 MG tablet Take 5 mg by mouth daily.      cholecalciferol, vitamin D3, 50 mcg (2,000 unit) capsule [CHOLECALCIFEROL, VITAMIN D3, 50 MCG (2,000 UNIT) CAPSULE] 1 capsule daily.      dorzolamide-timolol (COSOPT) 2-0.5 % ophthalmic solution INSTILL 1 DROP BY OPHTHALMIC ROUTE 2 TIMES A DAY INTO THE LEFT EYE      levothyroxine (SYNTHROID/LEVOTHROID) 75 MCG tablet Take 1 tablet (75 mcg) by mouth daily 90 tablet 3    netarsudil (RHOPRESSA) 0.02 % ophthalmic solution Place 1 drop Into the left eye at bedtime.      PROPYLENE GLYCOL/ (SYSTANE ULTRA OPHT) [PROPYLENE GLYCOL/ (SYSTANE ULTRA OPHT)] Apply to eye 2 (two) times a day.      rosuvastatin (CRESTOR) 5 MG tablet Take 1 tablet (5 mg) by mouth daily 90 tablet 1    vit C,A-Hd-npuhy-lutein-zeaxan (PRESERVISION AREDS-2) 250-90-40-1 mg cap Take 1 capsule by mouth 2 times daily.        No Known Allergies     Physical Examination Review of Systems   /62 (BP Location: Right arm, Patient Position: Sitting, Cuff Size: Adult Regular)   Pulse 80   Resp 12   Ht 1.702 m (5' 7\")   Wt 82.7 kg (182 lb 4.8 oz)   LMP  (LMP Unknown)   BMI 28.55 kg/m    Body mass index is 28.55 kg/m .  Wt Readings from Last 3 Encounters:   10/08/24 82.7 kg (182 lb 4.8 oz)   04/24/24 82.6 kg (182 lb)   02/28/24 81.7 kg (180 lb 3.2 oz)       General Appearance:   Pleasant  female, appears  stated age. no acute distress, normal body habitus   ENT/Mouth: membranes moist, no apparent gingival bleeding.      EYES:  no scleral icterus, normal conjunctivae   Neck: no " carotid bruits. No anterior cervical lymphadenopaty   Respiratory:   lungs are clear to auscultation, no rales or wheezing,  equal chest wall expansion    Cardiovascular:   Regular rhythm, normal rate. Normal first and second heart sounds with no murmurs, rubs, or gallops; the carotid, radial and posterior tibial pulses are intact, Jugular venous pressure normal, no edema bilaterally    Abdomen/GI:  no organomegaly, masses, bruits, or tenderness; bowel sounds are present   Extremities: no cyanosis or clubbing   Skin: no xanthelasma, warm.    Heme/lymph/ Immunology No apparent bleeding noted.   Neurologic: Alert and oriented. normal gait, no tremors     Psychiatric: Pleasant, calm, appropriate affect.    A complete 10 system review of systems was performed and is negative except as mentioned in the HPI/subjective.         Past History   Past Medical History:   Past Medical History:   Diagnosis Date    Hyperlipidemia     Hypothyroidism        Past Surgical History:   Past Surgical History:   Procedure Laterality Date    BIOPSY BREAST Right 01/01/2002    EYE SURGERY  IOL-Glaucoma 2020    GYN SURGERY  2005    TAHBSO    HYSTERECTOMY  01/01/2005    OOPHORECTOMY Bilateral 01/01/2005       Family History:   Family History   Problem Relation Age of Onset    Dementia Mother     Parathyroid Disorders Sister     Celiac Disease Daughter     No Known Problems Maternal Grandmother     No Known Problems Maternal Grandfather     No Known Problems Paternal Grandmother     No Known Problems Paternal Grandfather     No Known Problems Maternal Aunt     No Known Problems Paternal Aunt     Coronary Artery Disease Father         And Grandpa     Celiac Disease Daughter         &Brother    Heart Disease Brother     Hereditary Breast and Ovarian Cancer Syndrome No family hx of     Breast Cancer No family hx of     Cancer No family hx of     Colon Cancer No family hx of     Endometrial Cancer No family hx of     Ovarian Cancer No family hx of          Social History:   Social History     Socioeconomic History    Marital status:      Spouse name: Not on file    Number of children: Not on file    Years of education: Not on file    Highest education level: Not on file   Occupational History    Not on file   Tobacco Use    Smoking status: Never    Smokeless tobacco: Never   Vaping Use    Vaping status: Never Used   Substance and Sexual Activity    Alcohol use: Never    Drug use: Never    Sexual activity: Yes     Partners: Male     Birth control/protection: Post-menopausal   Other Topics Concern    Parent/sibling w/ CABG, MI or angioplasty before 65F 55M? Yes     Comment: Father, 39   Social History Narrative    Not on file     Social Determinants of Health     Financial Resource Strain: Low Risk  (4/23/2024)    Financial Resource Strain     Within the past 12 months, have you or your family members you live with been unable to get utilities (heat, electricity) when it was really needed?: No   Food Insecurity: Low Risk  (4/23/2024)    Food Insecurity     Within the past 12 months, did you worry that your food would run out before you got money to buy more?: No     Within the past 12 months, did the food you bought just not last and you didn t have money to get more?: No   Transportation Needs: Low Risk  (4/23/2024)    Transportation Needs     Within the past 12 months, has lack of transportation kept you from medical appointments, getting your medicines, non-medical meetings or appointments, work, or from getting things that you need?: No   Physical Activity: Insufficiently Active (4/23/2024)    Exercise Vital Sign     Days of Exercise per Week: 4 days     Minutes of Exercise per Session: 30 min   Stress: No Stress Concern Present (4/23/2024)    [employer] - Occupational Stress Questionnaire     Feeling of Stress : Only a little   Social Connections: Unknown (4/23/2024)    Social Connection and Isolation Panel [NHANES]      Frequency of Communication with Friends and Family: Not on file     Frequency of Social Gatherings with Friends and Family: More than three times a week     Attends Mosque Services: Not on file     Active Member of Clubs or Organizations: Not on file     Attends Club or Organization Meetings: Not on file     Marital Status: Not on file   Interpersonal Safety: Low Risk  (4/24/2024)    Interpersonal Safety     Do you feel physically and emotionally safe where you currently live?: Yes     Within the past 12 months, have you been hit, slapped, kicked or otherwise physically hurt by someone?: No     Within the past 12 months, have you been humiliated or emotionally abused in other ways by your partner or ex-partner?: No   Housing Stability: Low Risk  (4/23/2024)    Housing Stability     Do you have housing? : Yes     Are you worried about losing your housing?: No              Lab Results    Chemistry/lipid CBC Cardiac Enzymes/BNP/TSH/INR   Lab Results   Component Value Date    CHOL 153 04/24/2024    HDL 51 04/24/2024    LDL 72 04/24/2024    TRIG 150 (H) 04/24/2024    CR 0.80 04/24/2024    BUN 19.7 04/24/2024    POTASSIUM 4.7 04/24/2024     04/24/2024    CO2 25 04/24/2024      Lab Results   Component Value Date    WBC 5.6 04/24/2024    HGB 13.6 04/24/2024    HCT 41.3 04/24/2024    MCV 89 04/24/2024     04/24/2024    A1C 5.9 (H) 04/24/2024     Lab Results   Component Value Date    A1C 5.9 (H) 04/24/2024    Lab Results   Component Value Date    TSH 2.05 04/24/2024          Amilcar Yap MD [facility]  Non-Invasive Cardiologist  [facility]  Pager [phone]

## 2024-10-08 NOTE — LETTER
10/8/2024    Cassandra Valerio MD  [address] [address]    RE: Vida Najera       Dear Colleague,     I had the pleasure of seeing Vida Najera in the [facility].  HEART CARE ENCOUNTER NOTE        Assessment/Recommendations   Assessment:    Hyperlipidemia. Last LDL 72 mg/dL.  Coronary artery calcification with Agatston score of 124.5 noted 9/23/2022. She denies anginal symptoms.  Family history of premature coronary artery disease.  Acquired hypothyroidism.  Body mass index is 28.55 kg/m .    Plan:  Continue rosuvastatin 5 mg daily.  Continue aspirin 81 mg daily but if she has recurrent bleeding issues, it would be okay to stop this.  Recommended regular exercise and eating a Mediterranean diet.  Follow-up with me in 2 years       The longitudinal plan of care for the diagnosis(es)/condition(s) as documented were addressed during this visit. Due to the added complexity in care, I will continue to support Vida in the subsequent management and with ongoing continuity of care.    History of Present Illness   Ms. Vida Najera is a 74 year old female with a significant past history of HLD, recently diagnosed hypothyroidism, and a family history of premature CAD presenting for follow-up.    She exercises regularly and feels well with this. No issues on rosuvastatin. She does periodically get nosebleeds and stays on aspirin.    No chest pain/pressure/tightness, shortness of breath at rest or with exertion, light headedness/dizziness, pre-syncope, syncope, lower extremity swelling, palpitations, paroxysmal nocturnal dyspnea (PND), or orthopnea. Her father had an MI at age 32 and multiple subsequent MI before dying in his 50s. Her brother had stents and a pacemaker.     She started rosuvastatin 20 mg daily 11/4/2022 for a longstanding history of hyperlipidemia and a coronary artery calcium score of 124.5 noted on CT 9/23/2022. She seemed to be doing okay with this but  "then had elevated LFTs noted on follow-up labs. Around this time, she also felt muscle aches. Rosuvastatin was stopped. Interestingly her cholesterol numbers seemed to be worsening. Then on 2/21/2023 she was found to have a TSH of 128 and subsequently started thyroid replacement.      Cardiac Problems and Cardiac Diagnostics     Most Recent Cardiac testing:  Coronary CT calcium score 9/23/2022 (report reviewed):  Total Agatston score of 124.5     Medications  Allergies   Current Outpatient Medications   Medication Sig Dispense Refill     aspirin (ASA) 81 MG EC tablet Take 1 tablet (81 mg) by mouth daily 100 tablet 3     cetirizine (ZYRTEC) 5 MG tablet Take 5 mg by mouth daily.       cholecalciferol, vitamin D3, 50 mcg (2,000 unit) capsule [CHOLECALCIFEROL, VITAMIN D3, 50 MCG (2,000 UNIT) CAPSULE] 1 capsule daily.       dorzolamide-timolol (COSOPT) 2-0.5 % ophthalmic solution INSTILL 1 DROP BY OPHTHALMIC ROUTE 2 TIMES A DAY INTO THE LEFT EYE       levothyroxine (SYNTHROID/LEVOTHROID) 75 MCG tablet Take 1 tablet (75 mcg) by mouth daily 90 tablet 3     netarsudil (RHOPRESSA) 0.02 % ophthalmic solution Place 1 drop Into the left eye at bedtime.       PROPYLENE GLYCOL/ (SYSTANE ULTRA OPHT) [PROPYLENE GLYCOL/ (SYSTANE ULTRA OPHT)] Apply to eye 2 (two) times a day.       rosuvastatin (CRESTOR) 5 MG tablet Take 1 tablet (5 mg) by mouth daily 90 tablet 1     vit C,K-De-cphka-lutein-zeaxan (PRESERVISION AREDS-2) 250-90-40-1 mg cap Take 1 capsule by mouth 2 times daily.        No Known Allergies     Physical Examination Review of Systems   /62 (BP Location: Right arm, Patient Position: Sitting, Cuff Size: Adult Regular)   Pulse 80   Resp 12   Ht 1.702 m (5' 7\")   Wt 82.7 kg (182 lb 4.8 oz)   LMP  (LMP Unknown)   BMI 28.55 kg/m    Body mass index is 28.55 kg/m .  Wt Readings from Last 3 Encounters:   10/08/24 82.7 kg (182 lb 4.8 oz)   04/24/24 82.6 kg (182 lb)   02/28/24 81.7 kg (180 lb 3.2 oz) "       General Appearance:   Pleasant  female, appears  stated age. no acute distress, normal body habitus   ENT/Mouth: membranes moist, no apparent gingival bleeding.      EYES:  no scleral icterus, normal conjunctivae   Neck: no carotid bruits. No anterior cervical lymphadenopaty   Respiratory:   lungs are clear to auscultation, no rales or wheezing,  equal chest wall expansion    Cardiovascular:   Regular rhythm, normal rate. Normal first and second heart sounds with no murmurs, rubs, or gallops; the carotid, radial and posterior tibial pulses are intact, Jugular venous pressure normal, no edema bilaterally    Abdomen/GI:  no organomegaly, masses, bruits, or tenderness; bowel sounds are present   Extremities: no cyanosis or clubbing   Skin: no xanthelasma, warm.    Heme/lymph/ Immunology No apparent bleeding noted.   Neurologic: Alert and oriented. normal gait, no tremors     Psychiatric: Pleasant, calm, appropriate affect.    A complete 10 system review of systems was performed and is negative except as mentioned in the HPI/subjective.         Past History   Past Medical History:   Past Medical History:   Diagnosis Date     Hyperlipidemia      Hypothyroidism        Past Surgical History:   Past Surgical History:   Procedure Laterality Date     BIOPSY BREAST Right 01/01/2002     EYE SURGERY  IOL-Glaucoma 2020     GYN SURGERY  2005    TAHBSO     HYSTERECTOMY  01/01/2005     OOPHORECTOMY Bilateral 01/01/2005       Family History:   Family History   Problem Relation Age of Onset     Dementia Mother      Parathyroid Disorders Sister      Celiac Disease Daughter      No Known Problems Maternal Grandmother      No Known Problems Maternal Grandfather      No Known Problems Paternal Grandmother      No Known Problems Paternal Grandfather      No Known Problems Maternal Aunt      No Known Problems Paternal Aunt      Coronary Artery Disease Father         And Grandpa      Celiac Disease Daughter         &Brother     Heart  Disease Brother      Hereditary Breast and Ovarian Cancer Syndrome No family hx of      Breast Cancer No family hx of      Cancer No family hx of      Colon Cancer No family hx of      Endometrial Cancer No family hx of      Ovarian Cancer No family hx of         Social History:   Social History     Socioeconomic History     Marital status:      Spouse name: Not on file     Number of children: Not on file     Years of education: Not on file     Highest education level: Not on file   Occupational History     Not on file   Tobacco Use     Smoking status: Never     Smokeless tobacco: Never   Vaping Use     Vaping status: Never Used   Substance and Sexual Activity     Alcohol use: Never     Drug use: Never     Sexual activity: Yes     Partners: Male     Birth control/protection: Post-menopausal   Other Topics Concern     Parent/sibling w/ CABG, MI or angioplasty before 65F 55M? Yes     Comment: Father, 39   Social History Narrative     Not on file     Social Determinants of Health     Financial Resource Strain: Low Risk  (4/23/2024)    Financial Resource Strain      Within the past 12 months, have you or your family members you live with been unable to get utilities (heat, electricity) when it was really needed?: No   Food Insecurity: Low Risk  (4/23/2024)    Food Insecurity      Within the past 12 months, did you worry that your food would run out before you got money to buy more?: No      Within the past 12 months, did the food you bought just not last and you didn t have money to get more?: No   Transportation Needs: Low Risk  (4/23/2024)    Transportation Needs      Within the past 12 months, has lack of transportation kept you from medical appointments, getting your medicines, non-medical meetings or appointments, work, or from getting things that you need?: No   Physical Activity: Insufficiently Active (4/23/2024)    Exercise Vital Sign      Days of Exercise per Week: 4 days      Minutes of Exercise per  Session: 30 min   Stress: No Stress Concern Present (4/23/2024)    [employer] - Occupational Stress Questionnaire      Feeling of Stress : Only a little   Social Connections: Unknown (4/23/2024)    Social Connection and Isolation Panel [NHANES]      Frequency of Communication with Friends and Family: Not on file      Frequency of Social Gatherings with Friends and Family: More than three times a week      Attends Jehovah's witness Services: Not on file      Active Member of Clubs or Organizations: Not on file      Attends Club or Organization Meetings: Not on file      Marital Status: Not on file   Interpersonal Safety: Low Risk  (4/24/2024)    Interpersonal Safety      Do you feel physically and emotionally safe where you currently live?: Yes      Within the past 12 months, have you been hit, slapped, kicked or otherwise physically hurt by someone?: No      Within the past 12 months, have you been humiliated or emotionally abused in other ways by your partner or ex-partner?: No   Housing Stability: Low Risk  (4/23/2024)    Housing Stability      Do you have housing? : Yes      Are you worried about losing your housing?: No              Lab Results    Chemistry/lipid CBC Cardiac Enzymes/BNP/TSH/INR   Lab Results   Component Value Date    CHOL 153 04/24/2024    HDL 51 04/24/2024    LDL 72 04/24/2024    TRIG 150 (H) 04/24/2024    CR 0.80 04/24/2024    BUN 19.7 04/24/2024    POTASSIUM 4.7 04/24/2024     04/24/2024    CO2 25 04/24/2024      Lab Results   Component Value Date    WBC 5.6 04/24/2024    HGB 13.6 04/24/2024    HCT 41.3 04/24/2024    MCV 89 04/24/2024     04/24/2024    A1C 5.9 (H) 04/24/2024     Lab Results   Component Value Date    A1C 5.9 (H) 04/24/2024    Lab Results   Component Value Date    TSH 2.05 04/24/2024          Amilcar Yap MD [facility]  Non-Invasive Cardiologist  [facility]  Pager [phone]      Thank you for allowing me to participate in the  care of your patient.      Sincerely,     Amilcar Yap MD     [facility]  cc:   Amilcar Yap MD  [address]  [place] [address]

## 2024-10-25 ENCOUNTER — OFFICE VISIT (OUTPATIENT)
Dept: URGENT CARE | Facility: URGENT CARE | Age: 74
End: 2024-10-25
Payer: COMMERCIAL

## 2024-10-25 VITALS
HEART RATE: 102 BPM | DIASTOLIC BLOOD PRESSURE: 80 MMHG | OXYGEN SATURATION: 99 % | RESPIRATION RATE: 20 BRPM | TEMPERATURE: 98 F | SYSTOLIC BLOOD PRESSURE: 131 MMHG

## 2024-10-25 DIAGNOSIS — N30.00 ACUTE CYSTITIS WITHOUT HEMATURIA: ICD-10-CM

## 2024-10-25 DIAGNOSIS — R35.0 URINARY FREQUENCY: Primary | ICD-10-CM

## 2024-10-25 LAB
ALBUMIN UR-MCNC: NEGATIVE MG/DL
APPEARANCE UR: CLEAR
BACTERIA #/AREA URNS HPF: ABNORMAL /HPF
BILIRUB UR QL STRIP: NEGATIVE
COLOR UR AUTO: YELLOW
GLUCOSE UR STRIP-MCNC: NEGATIVE MG/DL
HGB UR QL STRIP: ABNORMAL
KETONES UR STRIP-MCNC: NEGATIVE MG/DL
LEUKOCYTE ESTERASE UR QL STRIP: ABNORMAL
NITRATE UR QL: NEGATIVE
PH UR STRIP: 5.5 [PH] (ref 5–8)
RBC #/AREA URNS AUTO: ABNORMAL /HPF
SP GR UR STRIP: >=1.03 (ref 1–1.03)
SQUAMOUS #/AREA URNS AUTO: ABNORMAL /LPF
UROBILINOGEN UR STRIP-ACNC: 0.2 E.U./DL
WBC #/AREA URNS AUTO: ABNORMAL /HPF
WBC CLUMPS #/AREA URNS HPF: PRESENT /HPF

## 2024-10-25 PROCEDURE — 99214 OFFICE O/P EST MOD 30 MIN: CPT | Performed by: PHYSICIAN ASSISTANT

## 2024-10-25 PROCEDURE — 87088 URINE BACTERIA CULTURE: CPT | Performed by: PHYSICIAN ASSISTANT

## 2024-10-25 PROCEDURE — 87186 SC STD MICRODIL/AGAR DIL: CPT | Performed by: PHYSICIAN ASSISTANT

## 2024-10-25 PROCEDURE — 87086 URINE CULTURE/COLONY COUNT: CPT | Performed by: PHYSICIAN ASSISTANT

## 2024-10-25 PROCEDURE — 81001 URINALYSIS AUTO W/SCOPE: CPT | Performed by: PHYSICIAN ASSISTANT

## 2024-10-25 RX ORDER — SULFAMETHOXAZOLE AND TRIMETHOPRIM 800; 160 MG/1; MG/1
1 TABLET ORAL 2 TIMES DAILY
Qty: 10 TABLET | Refills: 0 | Status: SHIPPED | OUTPATIENT
Start: 2024-10-25 | End: 2024-10-30

## 2024-10-25 NOTE — PROGRESS NOTES
Assessment & Plan     Urinary frequency    - UA Macroscopic with reflex to Microscopic and Culture  - UA Microscopic with Reflex to Culture  - Urine Culture    Acute cystitis without hematuria  Bactrim as ordered. No signs of ascending infeciton.    At the end of the encounter, I discussed results, diagnosis, medications. Discussed red flags for immediate return to clinic/ER, as well as indications for follow up if no improvement. Patient understood and agreed to plan. Patient was stable for discharge    BP rechecked by CSS, if remains elevated will get  few readings with primary clinic and follow-up with pcp if elevated.    - sulfamethoxazole-trimethoprim (BACTRIM DS) 800-160 MG tablet  Dispense: 10 tablet; Refill: 0           Mine Sesay PA-C  [facility] [place]    Dougie Mcpherson is a 74 year old female who presents to clinic today for the following health issues:  Chief Complaint   Patient presents with    UTI     Poss uti   Pain/frequency and there was blood  in her urine last week   Cramping        HPI    Dysuria, frequency, small amount of blood.    No fevers.  No nausea, vomiting.    Remote hx of uti but not recently.        Review of Systems        Objective    /80   Pulse 102   Temp 98  F (36.7  C)   Resp 20   LMP  (LMP Unknown)   SpO2 99%   Physical Exam   Patient is in no acute distress and appears well.  No costovertebral angle tenderness is present.  Abdomen is soft nontender to light or deep palpation no masses or hepatosplenomegaly present.  Results for orders placed or performed in visit on 10/25/24   UA Macroscopic with reflex to Microscopic and Culture     Status: Abnormal    Specimen: Urine, Clean Catch   Result Value Ref Range    Color Urine Yellow Colorless, Straw, Light Yellow, Yellow    Appearance Urine Clear Clear    Glucose Urine Negative Negative mg/dL    Bilirubin Urine Negative Negative    Ketones Urine Negative Negative mg/dL    Specific  Gravity Urine >=1.030 1.005 - 1.030    Blood Urine Small (A) Negative    pH Urine 5.5 5.0 - 8.0    Protein Albumin Urine Negative Negative mg/dL    Urobilinogen Urine 0.2 0.2, 1.0 E.U./dL    Nitrite Urine Negative Negative    Leukocyte Esterase Urine Small (A) Negative   UA Microscopic with Reflex to Culture     Status: Abnormal   Result Value Ref Range    Bacteria Urine Few (A) None Seen /HPF    RBC Urine 2-5 (A) 0-2 /HPF /HPF    WBC Urine 10-25 (A) 0-5 /HPF /HPF    Squamous Epithelials Urine Few (A) None Seen /LPF    WBC Clumps Urine Present (A) None Seen /HPF

## 2024-10-26 LAB
BACTERIA UR CULT: ABNORMAL
BACTERIA UR CULT: ABNORMAL

## 2025-03-07 ENCOUNTER — ANCILLARY PROCEDURE (OUTPATIENT)
Dept: MAMMOGRAPHY | Facility: CLINIC | Age: 75
End: 2025-03-07
Attending: INTERNAL MEDICINE
Payer: COMMERCIAL

## 2025-03-07 DIAGNOSIS — Z12.31 VISIT FOR SCREENING MAMMOGRAM: ICD-10-CM

## 2025-03-07 PROCEDURE — 77063 BREAST TOMOSYNTHESIS BI: CPT

## 2025-03-29 ENCOUNTER — HEALTH MAINTENANCE LETTER (OUTPATIENT)
Age: 75
End: 2025-03-29

## 2025-04-23 ENCOUNTER — MYC MEDICAL ADVICE (OUTPATIENT)
Dept: INTERNAL MEDICINE | Facility: CLINIC | Age: 75
End: 2025-04-23

## 2025-04-23 DIAGNOSIS — E03.9 ACQUIRED HYPOTHYROIDISM: Primary | ICD-10-CM

## 2025-04-24 NOTE — TELEPHONE ENCOUNTER
Can check thyroid now prior to appt. I placed orders, she will just need to make appt at lab.  
Routing to provider for review.  
28-Jul-2019 15:07

## 2025-04-25 ENCOUNTER — TRANSFERRED RECORDS (OUTPATIENT)
Dept: HEALTH INFORMATION MANAGEMENT | Facility: CLINIC | Age: 75
End: 2025-04-25
Payer: COMMERCIAL

## 2025-04-28 ENCOUNTER — LAB (OUTPATIENT)
Dept: LAB | Facility: CLINIC | Age: 75
End: 2025-04-28
Payer: COMMERCIAL

## 2025-04-28 DIAGNOSIS — E03.9 ACQUIRED HYPOTHYROIDISM: ICD-10-CM

## 2025-04-28 LAB — TSH SERPL DL<=0.005 MIU/L-ACNC: 2.06 UIU/ML (ref 0.3–4.2)

## 2025-04-28 PROCEDURE — 36415 COLL VENOUS BLD VENIPUNCTURE: CPT

## 2025-04-28 PROCEDURE — 84443 ASSAY THYROID STIM HORMONE: CPT

## 2025-05-14 ENCOUNTER — TRANSFERRED RECORDS (OUTPATIENT)
Dept: HEALTH INFORMATION MANAGEMENT | Facility: CLINIC | Age: 75
End: 2025-05-14
Payer: COMMERCIAL

## 2025-05-27 ENCOUNTER — TRANSFERRED RECORDS (OUTPATIENT)
Dept: HEALTH INFORMATION MANAGEMENT | Facility: CLINIC | Age: 75
End: 2025-05-27
Payer: COMMERCIAL